# Patient Record
Sex: MALE | Race: WHITE | NOT HISPANIC OR LATINO | Employment: OTHER | URBAN - METROPOLITAN AREA
[De-identification: names, ages, dates, MRNs, and addresses within clinical notes are randomized per-mention and may not be internally consistent; named-entity substitution may affect disease eponyms.]

---

## 2017-04-10 PROBLEM — R45.851 SUICIDAL IDEATION: Status: ACTIVE | Noted: 2017-04-10

## 2017-04-11 ENCOUNTER — HOSPITAL ENCOUNTER (INPATIENT)
Facility: HOSPITAL | Age: 52
LOS: 9 days | Discharge: HOME | DRG: 885 | End: 2017-04-20
Attending: PSYCHIATRY & NEUROLOGY | Admitting: PSYCHIATRY & NEUROLOGY
Payer: MEDICARE

## 2017-04-11 DIAGNOSIS — K21.9 GASTROESOPHAGEAL REFLUX DISEASE WITHOUT ESOPHAGITIS: Chronic | ICD-10-CM

## 2017-04-11 DIAGNOSIS — F12.10 CANNABIS ABUSE: ICD-10-CM

## 2017-04-11 DIAGNOSIS — R45.851 SUICIDAL IDEATION: ICD-10-CM

## 2017-04-11 DIAGNOSIS — I10 ESSENTIAL HYPERTENSION: Chronic | ICD-10-CM

## 2017-04-11 DIAGNOSIS — Z59.00 HOMELESSNESS: ICD-10-CM

## 2017-04-11 DIAGNOSIS — F10.10 ALCOHOL ABUSE: ICD-10-CM

## 2017-04-11 DIAGNOSIS — F31.64 BIPOLAR DISORDER, CURR EPISODE MIXED, SEVERE, WITH PSYCHOTIC FEATURES: Primary | ICD-10-CM

## 2017-04-11 DIAGNOSIS — F29 PSYCHOSIS, UNSPECIFIED PSYCHOSIS TYPE: Chronic | ICD-10-CM

## 2017-04-11 DIAGNOSIS — F14.10 COCAINE ABUSE: ICD-10-CM

## 2017-04-11 LAB
ALBUMIN SERPL BCP-MCNC: 3.6 G/DL
ALP SERPL-CCNC: 61 U/L
ALT SERPL W/O P-5'-P-CCNC: 28 U/L
ANION GAP SERPL CALC-SCNC: 10 MMOL/L
AST SERPL-CCNC: 39 U/L
BILIRUB SERPL-MCNC: 1.4 MG/DL
BUN SERPL-MCNC: 12 MG/DL
CALCIUM SERPL-MCNC: 9.8 MG/DL
CHLORIDE SERPL-SCNC: 100 MMOL/L
CHOLEST/HDLC SERPL: 3.3 {RATIO}
CO2 SERPL-SCNC: 29 MMOL/L
CREAT SERPL-MCNC: 0.8 MG/DL
EST. GFR  (AFRICAN AMERICAN): >60 ML/MIN/1.73 M^2
EST. GFR  (NON AFRICAN AMERICAN): >60 ML/MIN/1.73 M^2
GLUCOSE SERPL-MCNC: 100 MG/DL
HDL/CHOLESTEROL RATIO: 30.7 %
HDLC SERPL-MCNC: 228 MG/DL
HDLC SERPL-MCNC: 70 MG/DL
LDLC SERPL CALC-MCNC: 134.8 MG/DL
NONHDLC SERPL-MCNC: 158 MG/DL
POTASSIUM SERPL-SCNC: 3.2 MMOL/L
PROT SERPL-MCNC: 7.3 G/DL
SODIUM SERPL-SCNC: 139 MMOL/L
TRIGL SERPL-MCNC: 116 MG/DL

## 2017-04-11 PROCEDURE — 90853 GROUP PSYCHOTHERAPY: CPT | Mod: S$PBB,,, | Performed by: PSYCHOLOGIST

## 2017-04-11 PROCEDURE — 12400001 HC PSYCH SEMI-PRIVATE ROOM

## 2017-04-11 PROCEDURE — 80061 LIPID PANEL: CPT

## 2017-04-11 PROCEDURE — 36415 COLL VENOUS BLD VENIPUNCTURE: CPT

## 2017-04-11 PROCEDURE — 97150 GROUP THERAPEUTIC PROCEDURES: CPT

## 2017-04-11 PROCEDURE — 25000003 PHARM REV CODE 250: Performed by: PSYCHIATRY & NEUROLOGY

## 2017-04-11 PROCEDURE — 99223 1ST HOSP IP/OBS HIGH 75: CPT | Mod: ,,, | Performed by: PSYCHIATRY & NEUROLOGY

## 2017-04-11 PROCEDURE — 80053 COMPREHEN METABOLIC PANEL: CPT

## 2017-04-11 PROCEDURE — 97166 OT EVAL MOD COMPLEX 45 MIN: CPT

## 2017-04-11 RX ORDER — IBUPROFEN 200 MG
1 TABLET ORAL DAILY
Status: ON HOLD | COMMUNITY
End: 2017-04-20 | Stop reason: HOSPADM

## 2017-04-11 RX ORDER — VENLAFAXINE HYDROCHLORIDE 150 MG/1
150 CAPSULE, EXTENDED RELEASE ORAL DAILY
Status: ON HOLD | COMMUNITY
End: 2017-04-20 | Stop reason: HOSPADM

## 2017-04-11 RX ORDER — DIAZEPAM 5 MG/1
5 TABLET ORAL 3 TIMES DAILY
Status: DISCONTINUED | OUTPATIENT
Start: 2017-04-11 | End: 2017-04-12

## 2017-04-11 RX ORDER — QUETIAPINE FUMARATE 300 MG/1
300 TABLET, FILM COATED ORAL NIGHTLY
Status: ON HOLD | COMMUNITY
End: 2017-04-20 | Stop reason: HOSPADM

## 2017-04-11 RX ORDER — AMLODIPINE BESYLATE 5 MG/1
5 TABLET ORAL DAILY
Status: ON HOLD | COMMUNITY
End: 2017-04-20 | Stop reason: HOSPADM

## 2017-04-11 RX ORDER — LORAZEPAM 1 MG/1
2 TABLET ORAL EVERY 4 HOURS PRN
Status: DISCONTINUED | OUTPATIENT
Start: 2017-04-11 | End: 2017-04-20 | Stop reason: HOSPADM

## 2017-04-11 RX ORDER — TRAZODONE HYDROCHLORIDE 100 MG/1
100 TABLET ORAL NIGHTLY
Status: ON HOLD | COMMUNITY
End: 2017-04-20 | Stop reason: HOSPADM

## 2017-04-11 RX ORDER — NICOTINE 7MG/24HR
1 PATCH, TRANSDERMAL 24 HOURS TRANSDERMAL DAILY
Status: DISCONTINUED | OUTPATIENT
Start: 2017-04-11 | End: 2017-04-20 | Stop reason: HOSPADM

## 2017-04-11 RX ORDER — LORAZEPAM 1 MG/1
2 TABLET ORAL EVERY 6 HOURS PRN
Status: DISCONTINUED | OUTPATIENT
Start: 2017-04-11 | End: 2017-04-20 | Stop reason: HOSPADM

## 2017-04-11 RX ORDER — PANTOPRAZOLE SODIUM 40 MG/1
40 TABLET, DELAYED RELEASE ORAL DAILY
Status: DISCONTINUED | OUTPATIENT
Start: 2017-04-11 | End: 2017-04-20 | Stop reason: HOSPADM

## 2017-04-11 RX ORDER — DOCUSATE SODIUM 100 MG/1
100 CAPSULE, LIQUID FILLED ORAL DAILY PRN
Status: DISCONTINUED | OUTPATIENT
Start: 2017-04-11 | End: 2017-04-20 | Stop reason: HOSPADM

## 2017-04-11 RX ORDER — OMEPRAZOLE 20 MG/1
20 CAPSULE, DELAYED RELEASE ORAL DAILY
Status: ON HOLD | COMMUNITY
End: 2017-04-20 | Stop reason: HOSPADM

## 2017-04-11 RX ORDER — RISPERIDONE 1 MG/1
1 TABLET, ORALLY DISINTEGRATING ORAL 2 TIMES DAILY
Status: DISCONTINUED | OUTPATIENT
Start: 2017-04-11 | End: 2017-04-13

## 2017-04-11 RX ORDER — AMLODIPINE BESYLATE 2.5 MG/1
5 TABLET ORAL DAILY
Status: DISCONTINUED | OUTPATIENT
Start: 2017-04-11 | End: 2017-04-20 | Stop reason: HOSPADM

## 2017-04-11 RX ORDER — HALOPERIDOL 5 MG/ML
5 INJECTION INTRAMUSCULAR EVERY 6 HOURS PRN
Status: DISCONTINUED | OUTPATIENT
Start: 2017-04-11 | End: 2017-04-20 | Stop reason: HOSPADM

## 2017-04-11 RX ORDER — MELOXICAM 7.5 MG/1
7.5 TABLET ORAL DAILY
Status: ON HOLD | COMMUNITY
End: 2017-04-20 | Stop reason: HOSPADM

## 2017-04-11 RX ORDER — HYDROXYZINE PAMOATE 25 MG/1
25 CAPSULE ORAL 3 TIMES DAILY PRN
Status: ON HOLD | COMMUNITY
End: 2017-04-20 | Stop reason: HOSPADM

## 2017-04-11 RX ORDER — HALOPERIDOL 5 MG/1
5 TABLET ORAL EVERY 6 HOURS PRN
Status: DISCONTINUED | OUTPATIENT
Start: 2017-04-11 | End: 2017-04-20 | Stop reason: HOSPADM

## 2017-04-11 RX ORDER — DIPHENHYDRAMINE HYDROCHLORIDE 50 MG/ML
50 INJECTION INTRAMUSCULAR; INTRAVENOUS EVERY 6 HOURS PRN
Status: DISCONTINUED | OUTPATIENT
Start: 2017-04-11 | End: 2017-04-20 | Stop reason: HOSPADM

## 2017-04-11 RX ORDER — QUETIAPINE FUMARATE 100 MG/1
100 TABLET, FILM COATED ORAL NIGHTLY
Status: DISCONTINUED | OUTPATIENT
Start: 2017-04-11 | End: 2017-04-13

## 2017-04-11 RX ORDER — DIPHENHYDRAMINE HCL 50 MG
50 CAPSULE ORAL EVERY 6 HOURS PRN
Status: DISCONTINUED | OUTPATIENT
Start: 2017-04-11 | End: 2017-04-20 | Stop reason: HOSPADM

## 2017-04-11 RX ORDER — LORAZEPAM 2 MG/ML
2 INJECTION INTRAMUSCULAR EVERY 6 HOURS PRN
Status: DISCONTINUED | OUTPATIENT
Start: 2017-04-11 | End: 2017-04-20 | Stop reason: HOSPADM

## 2017-04-11 RX ORDER — FOLIC ACID 1 MG/1
1 TABLET ORAL DAILY
Status: DISCONTINUED | OUTPATIENT
Start: 2017-04-11 | End: 2017-04-20 | Stop reason: HOSPADM

## 2017-04-11 RX ORDER — RISPERIDONE 1 MG/1
1 TABLET, ORALLY DISINTEGRATING ORAL NIGHTLY
Status: ON HOLD | COMMUNITY
End: 2017-04-20 | Stop reason: HOSPADM

## 2017-04-11 RX ORDER — THIAMINE HCL 100 MG
100 TABLET ORAL DAILY
Status: DISCONTINUED | OUTPATIENT
Start: 2017-04-11 | End: 2017-04-20 | Stop reason: HOSPADM

## 2017-04-11 RX ADMIN — PANTOPRAZOLE SODIUM 40 MG: 40 TABLET, DELAYED RELEASE ORAL at 08:04

## 2017-04-11 RX ADMIN — AMLODIPINE BESYLATE 5 MG: 2.5 TABLET ORAL at 08:04

## 2017-04-11 RX ADMIN — DIAZEPAM 5 MG: 5 TABLET ORAL at 08:04

## 2017-04-11 RX ADMIN — RISPERIDONE 1 MG: 1 TABLET, ORALLY DISINTEGRATING ORAL at 11:04

## 2017-04-11 RX ADMIN — RISPERIDONE 1 MG: 1 TABLET, ORALLY DISINTEGRATING ORAL at 08:04

## 2017-04-11 RX ADMIN — FOLIC ACID 1 MG: 1 TABLET ORAL at 08:04

## 2017-04-11 RX ADMIN — THIAMINE HCL TAB 100 MG 100 MG: 100 TAB at 08:04

## 2017-04-11 RX ADMIN — NICOTINE 1 PATCH: 7 PATCH, EXTENDED RELEASE TRANSDERMAL at 08:04

## 2017-04-11 RX ADMIN — QUETIAPINE FUMARATE 100 MG: 100 TABLET, FILM COATED ORAL at 08:04

## 2017-04-11 RX ADMIN — DIAZEPAM 5 MG: 5 TABLET ORAL at 01:04

## 2017-04-11 RX ADMIN — THERA TABS 1 TABLET: TAB at 08:04

## 2017-04-11 RX ADMIN — QUETIAPINE FUMARATE 100 MG: 100 TABLET, FILM COATED ORAL at 01:04

## 2017-04-11 SDOH — SOCIAL DETERMINANTS OF HEALTH (SDOH): HOMELESSNESS UNSPECIFIED: Z59.00
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
## 2017-04-11 NOTE — PROGRESS NOTES
04/11/17 0900 04/11/17 1100 04/11/17 1400   [facility] Group Therapy   Group Name Community Reintegration (guided imagery) Therapeutic Recreation   Specific Interventions Current Events --  (movie social)   Participation Level Active;Appropriate;Attentive --  Active;Appropriate;Attentive   Participation Quality Cooperative;Social Refused Cooperative;Social   Insight/Motivation Good --  Good   Affect/Mood Display Appropriate --  Appropriate   Cognition Alert;Oriented --  Alert;Oriented

## 2017-04-11 NOTE — PLAN OF CARE
Problem: Patient Care Overview (Adult)  Goal: Plan of Care Review  Outcome: Ongoing (interventions implemented as appropriate)  Understands need to be in hospital and take medication. Cooperative with medication administration.  Goal: Interdisciplinary Rounds/Family Conf  Outcome: Ongoing (interventions implemented as appropriate)  Patient participated with interdisciplinary rounds.    Comments:   Understands need to be in hospital and take medication. Cooperative with medication administration.  Patient participated with interdisciplinary rounds.

## 2017-04-11 NOTE — IP AVS SNAPSHOT
[facility]  [address]  [facility] [address]  Phone: [phone]           Patient Discharge Instructions   Our goal is to set you up for success. This packet includes information on your condition, medications, and your home care.  It will help you care for yourself to prevent having to return to the hospital.     Please ask your nurse if you have any questions.      There are many details to remember when preparing to leave the hospital. Here is what you will need to do:    1. Take your medicine. If you are prescribed medications, review your Medication List on the following pages. You may have new medications to  at the pharmacy and others that you'll need to stop taking. Review the instructions for how and when to take your medications. Talk with your doctor or nurses if you are unsure of what to do.     2. Go to your follow-up appointments. Specific follow-up information is listed in the following pages. Your may be contacted by a nurse or clinical provider about future appointments. Be sure we have all of the phone numbers to reach you. Please contact your provider's office if you are unable to make an appointment.     3. Watch for warning signs. Your doctor or nurse will give you detailed warning signs to watch for and when to call for assistance. These instructions may also include educational information about your condition. If you experience any of warning signs to your health, call your doctor.           [facility]  Unless otherwise directed by your provider, please   contact [facility], our nurse care line   that is available for 24/7 assistance.     [phone] (toll-free)     Registered nurses in the [facility]   provide: appointment scheduling, clinical advisement, health education, and other advisory services.                  ** Verify the list of medication(s) below is accurate and up to date. Carry this with you in case of  emergency. If your medications have changed, please notify your healthcare provider.             Medication List      START taking these medications        Additional Info                      risperidone 2 MG tablet   Commonly known as:  RISPERDAL   Quantity:  30 tablet   Refills:  0   Dose:  2 mg   Indications:  Bipolar Disorder in Remission   Replaces:  risperidone 1 MG Tbdl    Last time this was given:  1 mg on 4/19/2017 10:48 PM   Instructions:  Take 1 tablet (2 mg total) by mouth every evening.     Begin Date    AM    Noon    PM    Bedtime         CHANGE how you take these medications        Additional Info                      hydrOXYzine pamoate 50 MG Cap   Commonly known as:  VISTARIL   Quantity:  30 capsule   Refills:  0   Dose:  50 mg   Indications:  Anxiety   What changed:    - medication strength  - how much to take  - when to take this  - reasons to take this    Last time this was given:  50 mg on 4/19/2017 10:47 PM   Instructions:  Take 1 capsule (50 mg total) by mouth every evening.     Begin Date    AM    Noon    PM    Bedtime       * quetiapine 100 MG Tab   Commonly known as:  SEROQUEL   Quantity:  30 tablet   Refills:  0   Dose:  100 mg   Indications:  Bipolar Disorder in Remission   What changed:    - medication strength  - how much to take  - when to take this    Last time this was given:  100 mg on 4/20/2017  9:37 AM   Instructions:  Take 1 tablet (100 mg total) by mouth once daily.     Begin Date    AM    Noon    PM    Bedtime       * quetiapine 300 MG Tab   Commonly known as:  SEROQUEL   Quantity:  30 tablet   Refills:  0   Dose:  300 mg   Indications:  Bipolar Disorder in Remission   What changed:  You were already taking a medication with the same name, and this prescription was added. Make sure you understand how and when to take each.    Last time this was given:  100 mg on 4/20/2017  9:37 AM   Instructions:  Take 1 tablet (300 mg total) by mouth every evening.     Begin Date    AM     Noon    PM    Bedtime       * Notice:  This list has 2 medication(s) that are the same as other medications prescribed for you. Read the directions carefully, and ask your doctor or other care provider to review them with you.      CONTINUE taking these medications        Additional Info                      amlodipine 5 MG tablet   Commonly known as:  NORVASC   Quantity:  30 tablet   Refills:  0   Dose:  5 mg   Indications:  Hypertension    Last time this was given:  5 mg on 4/20/2017  9:37 AM   Instructions:  Take 1 tablet (5 mg total) by mouth once daily.     Begin Date    AM    Noon    PM    Bedtime         STOP taking these medications     meloxicam 7.5 MG tablet   Commonly known as:  MOBIC       nicotine 21 mg/24 hr   Commonly known as:  NICODERM CQ       omeprazole 20 MG capsule   Commonly known as:  PRILOSEC       risperidone 1 MG Tbdl   Commonly known as:  RISPERDAL M-TABS   Replaced by:  risperidone 2 MG tablet       trazodone 100 MG tablet   Commonly known as:  DESYREL       venlafaxine 150 MG Cp24   Commonly known as:  EFFEXOR-XR            Where to Get Your Medications      You can get these medications from any pharmacy     Bring a paper prescription for each of these medications     amlodipine 5 MG tablet    hydrOXYzine pamoate 50 MG Cap    quetiapine 100 MG Tab    quetiapine 300 MG Tab    risperidone 2 MG tablet                  Please bring to all follow up appointments:    1. A copy of your discharge instructions.  2. All medicines you are currently taking in their original bottles.  3. Identification and insurance card.    Please arrive 15 minutes ahead of scheduled appointment time.    Please call 24 hours in advance if you must reschedule your appointment and/or time.          Discharge Instructions     Future Orders    Activity as tolerated     Diet general     Questions:    Total calories:      Fat restriction, if any:      Protein restriction, if any:      Na restriction, if any:      Fluid  restriction:      Additional restrictions:          Primary Diagnosis     Your primary diagnosis was:  Mood Problem      Admission Information     Date & Time Provider Department CSN    4/11/2017 12:13 AM Salvador Quigley MD [facility] [id number]       Admisson Diagnosis: Suicidal ideation      Care Providers     Provider Role Specialty Primary office phone    Salvador Quigley MD Attending Provider Psychiatry [phone]      Your Vitals Were     BP Pulse Temp Resp Height Weight    133/96 98 98 °F (36.7 °C) 17 6' (1.829 m) 77.3 kg (170 lb 6.7 oz)    BMI                23.11 kg/m2          Recent Lab Values     No lab values to display.      Blood Glucose and Lipid Panel Labs        4/11/2017                          10:09 AM           Cholesterol 228 (H)           Triglycerides 116           HDL Cholesterol 70           LDL Cholesterol 134.8           HDL/Cholesterol Ratio 30.7           Total Cholesterol/HDL Ratio 3.3           Non-HDL Cholesterol 158           Comment for Cholesterol at 10:09 AM on 4/11/2017:  The National Cholesterol Education Program (NCEP) has set the  following guidelines (reference ranges) for Cholesterol:  Optimal.....................<200 mg/dL  Borderline High.............200-239 mg/dL  High........................> or = 240 mg/dL      Comment for Triglycerides at 10:09 AM on 4/11/2017:  The National Cholesterol Education Program (NCEP) has set the  following guidelines (reference values) for triglycerides:  Normal......................<150 mg/dL  Borderline High.............150-199 mg/dL  High........................200-499 mg/dL      Comment for HDL Cholesterol at 10:09 AM on 4/11/2017:  The National Cholesterol Education Program (NCEP) has set the  following guidelines (reference values) for HDL Cholesterol:  Low...............<40 mg/dL  Optimal...........>60 mg/dL      Comment for LDL Cholesterol at 10:09 AM on 4/11/2017:  The National Cholesterol Education  Program (NCEP) has set the  following guidelines (reference values) for LDL Cholesterol:  Optimal.......................<130 mg/dL  Borderline High...............130-159 mg/dL  High..........................160-189 mg/dL  Very High.....................>190 mg/dL      Comment for Non-HDL Cholesterol at 10:09 AM on 4/11/2017:  Risk category and Non-HDL cholesterol goals:  Coronary heart disease (CHD)or equivalent (10-year risk of CHD >20%):  Non-HDL cholesterol goal     <130 mg/dL  Two or more CHD risk factors and 10-year risk of CHD <= 20%:  Non-HDL cholesterol goal     <160 mg/dL  0 to 1 CHD risk factor:  Non-HDL cholesterol goal     <190 mg/dL        Allergies as of 4/20/2017     No Known Allergies      Advance Directives     An advance directive is a document which, in the event you are no longer able to make decisions for yourself, tells your healthcare team what kind of treatment you do or do not want to receive, or who you would like to make those decisions for you.  If you do not currently have an advance directive, [facility] encourages you to create one.  For more information call:  [phone] ([phone]), [phone] ([phone]),  or log on to [email or web address].        Advance Directive Status          Most Recent Value    Advance Directive Status  Patient does not have Advance Directive, declines information.      Smoking Cessation     If you would like to quit smoking:   You may be eligible for free services if you are a [place] resident and started smoking cigarettes before September 1, 1988.  Call the Smoking Cessation Trust (SCT) toll free at (275) 875-2498 or (780) 599-9341.   Call 0-800-QUIT-NOW if you do not meet the above criteria.   Contact us via email: [email or web address]   View our website for more information: [email or web address]        Language Assistance Services     ATTENTION: Language assistance services are available, free of charge. Please call  [phone].      ATENCIÓN: Si habla domitila, tiene a rae disposición servicios gratuitos de asistencia lingüística. Cal al [phone].     CHÚ Ý: N?u b?n nói Ti?ng Vi?t, có các d?ch v? h? tr? ngôn ng? mi?n phí dành cho b?n. G?i s? [phone].        National Suicide Prevention Lifeline     If you or someone you know is thinking about suicide, call the National Suicide Prevention Lifeline.  National Suicide Hotline: 3-232-965-UREX (5230)  The lifeline is free, confidential and always available.  Help a loved one, a friend or yourself.  www.suicideprevenetionlifeline.org          [facility] Sign-Up     Activating your [facility] account is as easy as 1-2-3!     1) Visit [email or web address], select Sign Up Now, enter this activation code and your date of birth, then select Next.  [email or web address]  Expires: 6/4/2017  9:59 AM      2) Create a username and password to use when you visit [facility] in the future and select a security question in case you lose your password and select Next.    3) Enter your e-mail address and click Sign Up!    Additional Information  If you have questions, please e-mail [email or web address] or call [phone] to talk to our [facility] staff. Remember, [facility] is NOT to be used for urgent needs. For medical emergencies, dial 911.          [facility] complies with applicable Federal civil rights laws and does not discriminate on the basis of race, color, national origin, age, disability, or sex.
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
## 2017-04-11 NOTE — PROGRESS NOTES
Out of room ad narciso.  Attended unit functions and interacting with others.  Denies hearing voices or visual hallucinations.  Has reality based conversations.  Denies suicide ideation at this time.  Verbalized no thoughts to harm self.  Cooperative with staff.
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
## 2017-04-12 PROBLEM — Z59.00 HOMELESSNESS: Status: ACTIVE | Noted: 2017-04-12

## 2017-04-12 PROBLEM — I10 ESSENTIAL HYPERTENSION: Chronic | Status: ACTIVE | Noted: 2017-04-12

## 2017-04-12 PROBLEM — K21.9 GASTROESOPHAGEAL REFLUX DISEASE WITHOUT ESOPHAGITIS: Chronic | Status: ACTIVE | Noted: 2017-04-12

## 2017-04-12 LAB — HIV1+2 IGG SERPL QL IA.RAPID: NEGATIVE

## 2017-04-12 PROCEDURE — 25000003 PHARM REV CODE 250: Performed by: PSYCHIATRY & NEUROLOGY

## 2017-04-12 PROCEDURE — 97150 GROUP THERAPEUTIC PROCEDURES: CPT

## 2017-04-12 PROCEDURE — 12400001 HC PSYCH SEMI-PRIVATE ROOM

## 2017-04-12 PROCEDURE — 86701 HIV-1ANTIBODY: CPT

## 2017-04-12 PROCEDURE — 36415 COLL VENOUS BLD VENIPUNCTURE: CPT

## 2017-04-12 PROCEDURE — 90853 GROUP PSYCHOTHERAPY: CPT | Mod: S$PBB,,, | Performed by: PSYCHOLOGIST

## 2017-04-12 PROCEDURE — 99233 SBSQ HOSP IP/OBS HIGH 50: CPT | Mod: ,,, | Performed by: PSYCHIATRY & NEUROLOGY

## 2017-04-12 RX ORDER — DIAZEPAM 5 MG/1
5 TABLET ORAL 2 TIMES DAILY
Status: DISCONTINUED | OUTPATIENT
Start: 2017-04-12 | End: 2017-04-15

## 2017-04-12 RX ADMIN — DIAZEPAM 5 MG: 5 TABLET ORAL at 08:04

## 2017-04-12 RX ADMIN — LORAZEPAM 2 MG: 1 TABLET ORAL at 11:04

## 2017-04-12 RX ADMIN — PANTOPRAZOLE SODIUM 40 MG: 40 TABLET, DELAYED RELEASE ORAL at 08:04

## 2017-04-12 RX ADMIN — THIAMINE HCL TAB 100 MG 100 MG: 100 TAB at 08:04

## 2017-04-12 RX ADMIN — RISPERIDONE 1 MG: 1 TABLET, ORALLY DISINTEGRATING ORAL at 08:04

## 2017-04-12 RX ADMIN — NICOTINE 1 PATCH: 7 PATCH, EXTENDED RELEASE TRANSDERMAL at 11:04

## 2017-04-12 RX ADMIN — THERA TABS 1 TABLET: TAB at 08:04

## 2017-04-12 RX ADMIN — QUETIAPINE FUMARATE 100 MG: 100 TABLET, FILM COATED ORAL at 08:04

## 2017-04-12 RX ADMIN — FOLIC ACID 1 MG: 1 TABLET ORAL at 08:04

## 2017-04-12 RX ADMIN — AMLODIPINE BESYLATE 5 MG: 2.5 TABLET ORAL at 08:04

## 2017-04-12 RX ADMIN — DIAZEPAM 5 MG: 5 TABLET ORAL at 05:04
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
## 2017-04-12 NOTE — PROGRESS NOTES
Pt calm and cooperative on the unit attends group and participates. Pt slept ~ 8 hours last night. Still on Valium taper no prn medicine required for alcohol withdrawals during the night.

## 2017-04-12 NOTE — PROGRESS NOTES
Group Psychotherapy (PhD/LCSW)    Site: [facility]    Clinical status of patient: Inpatient    Date: 4/12/2017    Group Focus: Life Skills    Length of service: 83239 - 35-40 minutes    Number of patients in attendance: 5    Referred by: Acute Psychiatry Unit Treatment Team    Target symptoms: Alcohol Abuse, Substance Abuse and Psychosis    Patient's response to treatment: Active Listening Self-disclosure    Progress toward goals: Progressing slowly    Interval History:  Pt participated actively in a group discussion of problems solving techniques to resolve interpersonal conflict. His participation was more appropriate today. He clearly listened in group and was able to apply some of the content to his own situation. At times he needed to be limited so that he would not dominate the group with his concerns.       Diagnosis: See Dr Quigley's notes on chart dated 4/12/17    Plan: Continue treatment on [facility]

## 2017-04-12 NOTE — PLAN OF CARE
Problem: Patient Care Overview (Adult)  Goal: Plan of Care Review  Outcome: Ongoing (interventions implemented as appropriate)  Patient is visible on the unit.  Thought process appear organized and goal directed.  Mood is improving less negative and depressed.  Medication complaint.  Remains on the valium taper.  Attends groups and activities.  Social with select peer.      Problem: Mood Impairment (Depressive Signs/Symptoms) (Adult)  Goal: Improved Mood Symptoms  Outcome: Ongoing (interventions implemented as appropriate)  Mood less depressed     Problem: Cognitive Impairment (Depressive Signs/Symptoms) (Adult)  Goal: Improved Ability to Think/Concentrate  Outcome: Ongoing (interventions implemented as appropriate)  Thought processes are improved.  More organized and goal directed.     Problem: Impaired Control (Excessive Substance Use) (Adult)  Goal: Participates in Recovery Program  Outcome: Ongoing (interventions implemented as appropriate)  Patient cooperative with valium taper.     Problem: Safety Awareness Impairment (Excessive Substance Use) (Adult)  Goal: Enhanced Safety Awareness  Outcome: Ongoing (interventions implemented as appropriate)  Patient verbalizes understanding of the negative effects of substance abuse.

## 2017-04-12 NOTE — ASSESSMENT & PLAN NOTE
He self reported heavy drinking prior to admit, but ETOH level non detectable in ER  He had some mild vital signs abnormalities on admit - short valium taper initiated  No s/sx complicated withdrawal at this time - cont to monitor  Step down on valium to 5mg bid today

## 2017-04-12 NOTE — NURSING
Patient is visible on the unit.  Mood is anxious.  Reports he feels like he will not sleep on current HS medication.  Thought process are organize and goal directed.  Denies audio/visual hallucinations.  Also denies suicidal or homicidal ideations.   Remains on valium taper with no complaints of detox symptoms.  Dressed in paper scrubs appearance is unkempt.

## 2017-04-12 NOTE — ASSESSMENT & PLAN NOTE
Currently on norvasc  This could complicate management of alcohol withdrawal - will monitor closely
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
## 2017-04-12 NOTE — ASSESSMENT & PLAN NOTE
Could be contributing to reported psychotic symptoms  He is possibly interested in rehab  Motivational interviewing and relapse prevention provided

## 2017-04-12 NOTE — ASSESSMENT & PLAN NOTE
Patient endorses AH and PI but symptoms vague and history unclear.  Continue trial of risperdal 1mg bid  Also will allow low dose of seroquel to primarily target sleep

## 2017-04-12 NOTE — PROGRESS NOTES
04/12/17 0900 04/12/17 1100 04/12/17 1400   [facility] Group Therapy   Group Name Community Reintegration Education Therapeutic Recreation   Specific Interventions Current Events Relapse Prevention Crafts   Participation Level Active;Appropriate;Attentive Active;Appropriate;Attentive Active;Appropriate;Attentive   Participation Quality Cooperative;Social Cooperative;Social Cooperative;Social   Insight/Motivation Good Good Good   Affect/Mood Display Appropriate Appropriate Appropriate   Cognition Alert;Oriented Alert;Oriented Alert;Oriented

## 2017-04-12 NOTE — ASSESSMENT & PLAN NOTE
assisting  Looking into residential rehab vs group home vs homeless shelter as possible dispo plans  Will also call sister to investigate possible family assistance

## 2017-04-13 PROCEDURE — 12400001 HC PSYCH SEMI-PRIVATE ROOM

## 2017-04-13 PROCEDURE — 90853 GROUP PSYCHOTHERAPY: CPT | Mod: S$PBB,,, | Performed by: PSYCHOLOGIST

## 2017-04-13 PROCEDURE — 99232 SBSQ HOSP IP/OBS MODERATE 35: CPT | Mod: ,,, | Performed by: PSYCHIATRY & NEUROLOGY

## 2017-04-13 PROCEDURE — 25000003 PHARM REV CODE 250: Performed by: PSYCHIATRY & NEUROLOGY

## 2017-04-13 RX ORDER — QUETIAPINE FUMARATE 200 MG/1
200 TABLET, FILM COATED ORAL NIGHTLY
Status: DISCONTINUED | OUTPATIENT
Start: 2017-04-13 | End: 2017-04-14

## 2017-04-13 RX ADMIN — PANTOPRAZOLE SODIUM 40 MG: 40 TABLET, DELAYED RELEASE ORAL at 09:04

## 2017-04-13 RX ADMIN — DIAZEPAM 5 MG: 5 TABLET ORAL at 09:04

## 2017-04-13 RX ADMIN — RISPERIDONE 1 MG: 1 TABLET, ORALLY DISINTEGRATING ORAL at 09:04

## 2017-04-13 RX ADMIN — NICOTINE 1 PATCH: 7 PATCH, EXTENDED RELEASE TRANSDERMAL at 09:04

## 2017-04-13 RX ADMIN — THIAMINE HCL TAB 100 MG 100 MG: 100 TAB at 09:04

## 2017-04-13 RX ADMIN — QUETIAPINE FUMARATE 200 MG: 200 TABLET, FILM COATED ORAL at 08:04

## 2017-04-13 RX ADMIN — FOLIC ACID 1 MG: 1 TABLET ORAL at 09:04

## 2017-04-13 RX ADMIN — THERA TABS 1 TABLET: TAB at 09:04

## 2017-04-13 RX ADMIN — DIAZEPAM 5 MG: 5 TABLET ORAL at 08:04

## 2017-04-13 RX ADMIN — AMLODIPINE BESYLATE 5 MG: 2.5 TABLET ORAL at 09:04

## 2017-04-13 NOTE — PROGRESS NOTES
sandy spoke to [facility]LA[facility] and sw will be sendind a referral for possibilities of placement with group home housing in the [facility].

## 2017-04-13 NOTE — PROGRESS NOTES
04/13/17 0900 04/13/17 1000 04/13/17 1100   [facility] Group Therapy   Group Name Community Reintegration Mental Awareness (guided imagery)   Specific Interventions Current Events Cognitive Stimulation Training Relaxation Training   Participation Level Active;Attentive;Sharing;Appropriate Active;Attentive Active;Supportive;Appropriate   Participation Quality Cooperative;Social Cooperative;Social Cooperative;Social   Insight/Motivation Good Good Good   Affect/Mood Display Appropriate Appropriate Appropriate   Cognition Alert;Oriented Alert;Oriented Alert;Oriented       04/13/17 1300   [facility] Group Therapy   Group Name Therapeutic Recreation   Specific Interventions MWI   Participation Level Active;Appropriate;Attentive   Participation Quality Cooperative;Social   Insight/Motivation Good   Affect/Mood Display Appropriate   Cognition Alert;Oriented
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
## 2017-04-13 NOTE — ASSESSMENT & PLAN NOTE
Could be contributing to reported psychotic symptoms  He is possibly interested in rehab  Motivational interviewing and relapse prevention provided; will continue

## 2017-04-13 NOTE — ASSESSMENT & PLAN NOTE
-Patient denies psychotic symptoms at this time, but reported AH last night  -Discontinue risperdal; will use seroquel to target psychotic symptoms and mood stabilization  -Increase seroquel to 200 mg qHS (pt says this is the medication that works best for him)

## 2017-04-13 NOTE — PROGRESS NOTES
04/13/17 0700   Patient/Family Goals   Goal Formulation With patient   Time For Goal Achievement 5 days   Goal 1 attend activities, relaxation, and relapse prevention education   Assessment   Leisure Interest Movies;Sit Outside;Enjoy Nature   Leisure Barriers Homeless;Poor Impulse Control;ETOH Use;Drug Use;Physical Limitation;Lack of Finances   Treatment Focus To Improve Mood;To Educate and Increase Awareness of Sober Free Activities;To Improve Coping Skills;To Decrease Agitation and Increase Frustration Tolerance

## 2017-04-13 NOTE — ASSESSMENT & PLAN NOTE
He self reported heavy drinking prior to admit, but ETOH level non detectable in ER  He had some mild vital signs abnormalities on admit - short valium taper initiated  No s/sx complicated withdrawal at this time - cont to monitor  Continue valium 5 mg BID

## 2017-04-13 NOTE — PROGRESS NOTES
"Pt slept ~ 7 hours last night. He did receive a prn Ativan for anxiety @ 22:50, he disagrees with the decrease in the Seroquel, states " I need 400 mg to sleep." Med compliant attends group and participates. Good appetite and good hygiene. Pt gets irritated when he does not get what he wants, but has not acted out. POC discussed no questions at this time.   "

## 2017-04-13 NOTE — PROGRESS NOTES
Pt does not have any income at this time and cannot afford a group home. Pt receives SSI and disability income. Sw discussed option available at this time. Pt provided a phone number for his sister Karen Cruz [phone]. Sw called her but she is at work and could not talk.    Sw will continue to look at options available. Patient is homeless.

## 2017-04-13 NOTE — PROGRESS NOTES
Group Psychotherapy (PhD/LCSW)    Site: [facility]    Clinical status of patient: Inpatient    Date: 4/13/2017    Group Focus: Life Skills    Length of service: 47570 - 35-40 minutes    Number of patients in attendance: 5    Referred by: Acute Psychiatry Unit Treatment Team    Target symptoms: Alcohol Abuse, Substance Abuse and Psychosis    Patient's response to treatment: Active Listening Self-disclosure    Progress toward goals: Progressing slowly    Interval History:  Pt participated briefly in group and then appeared to become drowsy and lose interest. He noted that he is attempting to control himself and manage his anger. He said he was able to catch himself from getting too upset about something that happened on the unit that bothered him.      Diagnosis: See Dr Pagan's notes on chart dated 4/13/17    Plan: Continue treatment on [facility]

## 2017-04-14 PROCEDURE — 25000003 PHARM REV CODE 250: Performed by: PSYCHIATRY & NEUROLOGY

## 2017-04-14 PROCEDURE — 99233 SBSQ HOSP IP/OBS HIGH 50: CPT | Mod: S$PBB,GC,, | Performed by: INTERNAL MEDICINE

## 2017-04-14 PROCEDURE — 12400001 HC PSYCH SEMI-PRIVATE ROOM

## 2017-04-14 RX ORDER — QUETIAPINE FUMARATE 200 MG/1
400 TABLET, FILM COATED ORAL NIGHTLY
Status: DISCONTINUED | OUTPATIENT
Start: 2017-04-14 | End: 2017-04-15

## 2017-04-14 RX ADMIN — NICOTINE 1 PATCH: 7 PATCH, EXTENDED RELEASE TRANSDERMAL at 09:04

## 2017-04-14 RX ADMIN — DIAZEPAM 5 MG: 5 TABLET ORAL at 09:04

## 2017-04-14 RX ADMIN — FOLIC ACID 1 MG: 1 TABLET ORAL at 09:04

## 2017-04-14 RX ADMIN — THERA TABS 1 TABLET: TAB at 09:04

## 2017-04-14 RX ADMIN — THIAMINE HCL TAB 100 MG 100 MG: 100 TAB at 09:04

## 2017-04-14 RX ADMIN — AMLODIPINE BESYLATE 5 MG: 2.5 TABLET ORAL at 09:04

## 2017-04-14 RX ADMIN — PANTOPRAZOLE SODIUM 40 MG: 40 TABLET, DELAYED RELEASE ORAL at 09:04

## 2017-04-14 NOTE — PROGRESS NOTES
04/14/17 1400   [facility] Group Therapy   Group Name Therapeutic Recreation   Specific Interventions Holiday Activity   Participation Level Active;Appropriate;Attentive   Participation Quality Cooperative   Insight/Motivation Good   Affect/Mood Display Appropriate   Cognition Alert;Oriented

## 2017-04-14 NOTE — PLAN OF CARE
Problem: Patient Care Overview (Adult)  Goal: Plan of Care Review  Outcome: Ongoing (interventions implemented as appropriate)  Patient is dressed in street clothes.  Mood is improved.  Patient reports less depressed.  Thought processes are clearer and goal directed.  Compliant with medication.  Reports not sleeping well.  Appetite good.  Visible on the unit.  Attending select group.  Minimally social with peers.     Problem: Mood Impairment (Depressive Signs/Symptoms) (Adult)  Goal: Improved Mood Symptoms  Outcome: Ongoing (interventions implemented as appropriate)  Mood is improved with some irrtabililty noted.    Problem: Cognitive Impairment (Depressive Signs/Symptoms) (Adult)  Goal: Improved Ability to Think/Concentrate  Outcome: Outcome(s) achieved Date Met:  04/14/17  Thought process  Is clear and goal directed.     Problem: Impaired Control (Excessive Substance Use) (Adult)  Goal: Participates in Recovery Program  Outcome: Ongoing (interventions implemented as appropriate)  Minimally cooperative with recovery plan    Problem: Safety Awareness Impairment (Excessive Substance Use) (Adult)  Goal: Enhanced Safety Awareness  Outcome: Ongoing (interventions implemented as appropriate)  Verbalizes understanding of the negative effects of substance abuse.
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
## 2017-04-14 NOTE — ASSESSMENT & PLAN NOTE
-Patient is intrusive, has perseverative tp, and is endorsing near constant AHx  -Discontinued risperdal; will use seroquel to target psychotic symptoms and mood stabilization  -Increase seroquel to 400 mg qHS (pt says this is the medication that works best for him)

## 2017-04-14 NOTE — PROGRESS NOTES
04/13/17 2300   Activity/Group Therapy Checklist   Group Goals/Reflection   Attendance Attended   Follows Direction Followed directions   Group Interactions/Observations Interacted appropriately   Affect/Mood Range Normal range   Affect/Mood Display Appropriate
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
## 2017-04-15 PROCEDURE — 25000003 PHARM REV CODE 250: Performed by: PSYCHIATRY & NEUROLOGY

## 2017-04-15 PROCEDURE — 99232 SBSQ HOSP IP/OBS MODERATE 35: CPT | Mod: S$PBB,GC,, | Performed by: INTERNAL MEDICINE

## 2017-04-15 PROCEDURE — 12400001 HC PSYCH SEMI-PRIVATE ROOM

## 2017-04-15 RX ORDER — DIAZEPAM 5 MG/1
5 TABLET ORAL DAILY
Status: DISCONTINUED | OUTPATIENT
Start: 2017-04-16 | End: 2017-04-18

## 2017-04-15 RX ORDER — RISPERIDONE 1 MG/1
2 TABLET ORAL NIGHTLY
Status: DISCONTINUED | OUTPATIENT
Start: 2017-04-15 | End: 2017-04-18

## 2017-04-15 RX ORDER — RISPERIDONE 1 MG/1
1 TABLET ORAL 2 TIMES DAILY PRN
Status: DISCONTINUED | OUTPATIENT
Start: 2017-04-15 | End: 2017-04-20 | Stop reason: HOSPADM

## 2017-04-15 RX ADMIN — DIAZEPAM 5 MG: 5 TABLET ORAL at 09:04

## 2017-04-15 RX ADMIN — RISPERIDONE 2 MG: 1 TABLET ORAL at 09:04

## 2017-04-15 RX ADMIN — AMLODIPINE BESYLATE 5 MG: 2.5 TABLET ORAL at 09:04

## 2017-04-15 RX ADMIN — DIPHENHYDRAMINE HYDROCHLORIDE 50 MG: 50 CAPSULE ORAL at 11:04

## 2017-04-15 RX ADMIN — HALOPERIDOL 5 MG: 5 TABLET ORAL at 11:04

## 2017-04-15 RX ADMIN — FOLIC ACID 1 MG: 1 TABLET ORAL at 09:04

## 2017-04-15 RX ADMIN — PANTOPRAZOLE SODIUM 40 MG: 40 TABLET, DELAYED RELEASE ORAL at 09:04

## 2017-04-15 RX ADMIN — LORAZEPAM 2 MG: 1 TABLET ORAL at 11:04

## 2017-04-15 RX ADMIN — THIAMINE HCL TAB 100 MG 100 MG: 100 TAB at 09:04

## 2017-04-15 RX ADMIN — NICOTINE 1 PATCH: 7 PATCH, EXTENDED RELEASE TRANSDERMAL at 09:04

## 2017-04-15 RX ADMIN — THERA TABS 1 TABLET: TAB at 09:04

## 2017-04-15 NOTE — NURSING
Patient refused hs medication because they did not give him the medication the way  He wanted them.  Tried to talk to patient to change his mind with no results.

## 2017-04-15 NOTE — NURSING
"Pt observed kicking furniture in tv room. Pt is visibly upset stating that he asked for tv volume to be raised but it did not happen fast enough for him. Pt loud, cursing, states he is upset bc his seroquil dosage was decreased and he did not receive something to help him sleep. Pt was asked if he wanted something to help him relax but he refused and states "I ani't taking nothing. If they don't give me medicine I ask for I ain't taking nothing". Pt heard making verbal threats against other staff. Pt is asked to calm down, will continue to monitor closely.   "

## 2017-04-15 NOTE — ASSESSMENT & PLAN NOTE
He self reported heavy drinking prior to admit, but ETOH level non detectable in ER  He had some mild vital signs abnormalities on admit - short valium taper initiated  No s/sx complicated withdrawal at this time - cont to monitor  Decrease valium 5 mg daily

## 2017-04-15 NOTE — PROGRESS NOTES
Pt had ~ 5 hours of sleep. Pt remain free from fall or injury during HS. Continue to monitor pt safety and POC.
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
## 2017-04-15 NOTE — PROGRESS NOTES
Pt calm and cooperative this evening attends group and participates. Follow direction, Pt did refuse HS medicine because he now wants risperidone along with 400 mg Seroquel. Pt denies SI/HI/VH, he does endorse AH but will not elaborate on this. I have not seen any signs of psychosis but will continue to monitor closely for changes.

## 2017-04-15 NOTE — NURSING
Pt decided to take his medicines today. Pt less irritable and not agitated at this moment. Pt compliant with treatment at this time.
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
## 2017-04-15 NOTE — SUBJECTIVE & OBJECTIVE
Interval History: Pt seen and examined in treatment team.  States he is doing well this AM, but wants to resume Risperdal in addition to his Seroquel.  Clarified with patient that he could take Seroquel or Risperdal, but not both.  He agrees to take Risperdal instead of Seroquel as it was more helpful for his anxiety.  He is very fixated on his medications, and kicked some furniture in frustration this AM and refused medications.  He later calmed down and took his medications later.  Does not appear to be in alcohol withdrawal this am but BP elevated to 141/102 this AM, but he is not tachycardic.  No other complaints.      Denies SI, HI, but reports hearing AVH over night because he did not take his medications overnight.      Psychotherapeutics     Start     Stop Route Frequency Ordered    04/11/17 0207  haloperidol tablet 5 mg  (Med - Acute  Behavioral Management)      -- Oral Every 6 hours PRN 04/11/17 0125    04/11/17 0208  lorazepam tablet 2 mg  (Med - Acute  Behavioral Management)      -- Oral Every 6 hours PRN 04/11/17 0125    04/11/17 0208  haloperidol lactate injection 5 mg  (Med - Acute  Behavioral Management)      -- IM Every 6 hours PRN 04/11/17 0125    04/11/17 0208  lorazepam injection 2 mg  (Med - Acute  Behavioral Management)      -- IM Every 6 hours PRN 04/11/17 0125    04/11/17 0209  lorazepam tablet 2 mg      -- Oral Every 4 hours PRN 04/11/17 0125    04/12/17 2100  diazePAM tablet 5 mg      -- Oral 2 times daily 04/12/17 0840    04/14/17 2100  quetiapine tablet 400 mg      -- Oral Nightly 04/14/17 1218           Review of Systems  Objective:     Vital Signs (Most Recent):  Temp: 97.8 °F (36.6 °C) (04/14/17 1945)  Pulse: 94 (04/14/17 1945)  Resp: 18 (04/14/17 1945)  BP: (!) 143/92 (04/14/17 1945) Vital Signs (24h Range):  Temp:  [97.8 °F (36.6 °C)-98.7 °F (37.1 °C)] 97.8 °F (36.6 °C)  Pulse:  [] 94  Resp:  [17-20] 18  BP: (125-143)/(61-92) 143/92     Height: 6' (182.9 cm)  Weight: 77.3 kg  "(170 lb 6.7 oz)  Body mass index is 23.11 kg/(m^2).    No intake or output data in the 24 hours ending 04/15/17 1106    Physical Exam   Psychiatric:   Mental Status Exam:    General: dressed with a blanket  Appearance: in paper scrubs and blanket  Orientation: person, place, situation  Behavior: mostly cooperative  Psychomotor:  No pmr/pma  Speech: normal tone, volume, prosody  Language: responds appropriately  Mood: "I'm not good today, y'all changed my medications"  Affect: mostly neutral  Thought process: linear  Thought content:denies SI, HI  Thought perceptions: endorsed AVH overnight, none current  Attention: intact to conversation  Associations: non loose  Insight: poor  Judgment: limited but improving          Significant Labs:   Last 24 Hours:   Recent Lab Results     None          Significant Imaging: I have reviewed all pertinent imaging results/findings within the past 24 hours.  "

## 2017-04-15 NOTE — ASSESSMENT & PLAN NOTE
-Patient is intrusive, has perseverative tp, and is endorsing near constant AHx  -Discontinued Seroquel as patient noted more improvement with Risperdal  -Resume Risperdal 2mg PO qhs and add PRN Risperdal 1mg PO BID for breakthrough psychosis or agitation

## 2017-04-15 NOTE — PLAN OF CARE
Problem: Patient Care Overview (Adult)  Goal: Plan of Care Review  Outcome: Ongoing (interventions implemented as appropriate)  Pt was irritable and agitated for part of the day. Pt became verbally abusive and threatening to staff. Pt was redirected for part of the day w/o medicines, but had to receive prn oral b52 due to outburst at [facility] and continued to make lewd threats at staff. Pt was splitting staff, but became compliant after sleeping for 1 hr post b52. Pt was compliant with medicines this morning after initially refusing morning medications bc he wanted risperidone. Pt was visible on the unit, stayed in the tv room for most of the day. Pt denied any pain but reported anxiety earlier in the day. Pt had no outburst after b52 and being redirected, Pt asked to not threaten staff anymore, pt verbalized understanding. NAD noted, will continue to monitor.

## 2017-04-16 PROCEDURE — 25000003 PHARM REV CODE 250: Performed by: PSYCHIATRY & NEUROLOGY

## 2017-04-16 PROCEDURE — 99232 SBSQ HOSP IP/OBS MODERATE 35: CPT | Mod: S$PBB,GC,, | Performed by: INTERNAL MEDICINE

## 2017-04-16 PROCEDURE — 12400001 HC PSYCH SEMI-PRIVATE ROOM

## 2017-04-16 RX ORDER — HYDROXYZINE PAMOATE 50 MG/1
50 CAPSULE ORAL NIGHTLY
Status: DISCONTINUED | OUTPATIENT
Start: 2017-04-16 | End: 2017-04-20 | Stop reason: HOSPADM

## 2017-04-16 RX ADMIN — THERA TABS 1 TABLET: TAB at 09:04

## 2017-04-16 RX ADMIN — HYDROXYZINE PAMOATE 50 MG: 50 CAPSULE ORAL at 08:04

## 2017-04-16 RX ADMIN — THIAMINE HCL TAB 100 MG 100 MG: 100 TAB at 09:04

## 2017-04-16 RX ADMIN — DIAZEPAM 5 MG: 5 TABLET ORAL at 09:04

## 2017-04-16 RX ADMIN — FOLIC ACID 1 MG: 1 TABLET ORAL at 09:04

## 2017-04-16 RX ADMIN — PANTOPRAZOLE SODIUM 40 MG: 40 TABLET, DELAYED RELEASE ORAL at 09:04

## 2017-04-16 RX ADMIN — AMLODIPINE BESYLATE 5 MG: 2.5 TABLET ORAL at 09:04

## 2017-04-16 RX ADMIN — RISPERIDONE 2 MG: 1 TABLET ORAL at 08:04

## 2017-04-16 RX ADMIN — NICOTINE 1 PATCH: 7 PATCH, EXTENDED RELEASE TRANSDERMAL at 09:04

## 2017-04-16 NOTE — ASSESSMENT & PLAN NOTE
-Patient has been intrusive, perseverative tp, and is endorsed near constant AH; no current AVH  -Discontinued Seroquel as patient noted more improvement with Risperdal  -Continue Risperdal 2mg PO qhs and add PRN Risperdal 1mg PO BID for breakthrough psychosis or agitation   -Start 50mg Vistaril qHS to improve sleep

## 2017-04-16 NOTE — PLAN OF CARE
Problem: Patient Care Overview (Adult)  Goal: Plan of Care Review  Outcome: Ongoing (interventions implemented as appropriate)  Pt. awake in bed upon initial assessment. Denied SI/HI, A/V hallucinations. Pt's quiet isolative and withdrawn, did not attend scheduled groups. Took scheduled medications without any problems. No agitation or hostiltiy noted. Will continue to monitor.
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
## 2017-04-16 NOTE — SUBJECTIVE & OBJECTIVE
"Interval History: Per staff: "Pt was irritable and agitated for part of the day. Pt became verbally abusive and threatening to staff. Pt was redirected for part of the day w/o medicines, but had to receive prn oral b52 due to outburst at [facility] and continued to make lewd threats at staff. Pt was splitting staff, but became compliant after sleeping for 1 hr post b52. Pt was compliant with medicines this morning after initially refusing morning medications bc he wanted risperidone. Pt was visible on the unit, stayed in the tv room for most of the day. Pt denied any pain but reported anxiety earlier in the day. Pt had no outburst after b52 and being redirected, Pt asked to not threaten staff anymore, pt verbalized understanding. NAD noted, will continue to monitor."    Patient reports doing "okay" today. Denies any current A/V hallucinations. Continues with poor sleep, and is agreeable to trial of Vistaril. Endorses continued thoughts of harming others once he gets discharged, but "would rather not say" who it is targeted towards. No current SI, but does endorse episodic SI with multiple possible plans. Good appetite.    Psychotherapeutics     Start     Stop Route Frequency Ordered    04/11/17 0207  haloperidol tablet 5 mg  (Med - Acute  Behavioral Management)      -- Oral Every 6 hours PRN 04/11/17 0125    04/11/17 0208  lorazepam tablet 2 mg  (Med - Acute  Behavioral Management)      -- Oral Every 6 hours PRN 04/11/17 0125    04/11/17 0208  haloperidol lactate injection 5 mg  (Med - Acute  Behavioral Management)      -- IM Every 6 hours PRN 04/11/17 0125    04/11/17 0208  lorazepam injection 2 mg  (Med - Acute  Behavioral Management)      -- IM Every 6 hours PRN 04/11/17 0125    04/11/17 0209  lorazepam tablet 2 mg      -- Oral Every 4 hours PRN 04/11/17 0125    04/15/17 2100  risperidone tablet 2 mg      -- Oral Nightly 04/15/17 1120    04/15/17 1220  risperidone tablet 1 mg      -- Oral 2 times daily PRN 04/15/17 1120 " "   04/16/17 0900  diazePAM tablet 5 mg      -- Oral Daily 04/15/17 1129           Review of Systems  Objective:     Vital Signs (Most Recent):  Temp: 99 °F (37.2 °C) (04/16/17 0930)  Pulse: 101 (04/16/17 0930)  Resp: 18 (04/15/17 1938)  BP: (!) 146/89 (04/16/17 0930) Vital Signs (24h Range):  Temp:  [97.6 °F (36.4 °C)-99 °F (37.2 °C)] 99 °F (37.2 °C)  Pulse:  [] 101  Resp:  [16-18] 18  BP: (132-146)/() 146/89     Height: 6' (182.9 cm)  Weight: 77.3 kg (170 lb 6.7 oz)  Body mass index is 23.11 kg/(m^2).    No intake or output data in the 24 hours ending 04/16/17 1005    Physical Exam     MUSCULOSKELETAL  Muscle Strength and Tone: normal strength and tone  Abnormal Involuntary Movements: no abnormal involuntary movements appreciated  Gait and Station: ambulates with steady gait without assistance    PSYCHIATRIC   Arousal: alert  Sensorium/Orientation: person, place, situation, time/date  Behavior/Cooperation: normal, cooperative, eye contact normal   Psychomotor: unremarkable and within normal limits  Speech: normal tone, volume, prosody  Language: responds appropriately   Mood: "okay"   Affect: neutral  Thought Process: linear, logical  Thought Content:   Auditory hallucinations: NO     Visual hallucinations: NO  Paranoia: NO  Delusions:  NO  Suicidal ideation: YES: none currently     Homicidal ideation: YES: not willing to reveal name     Attention/Concentration:  intact  able to focus  Memory: Intact  Fund of Knowledge: Vocabulary appropriate    Insight: Decreased  Judgment:improving      Significant Labs:   Last 24 Hours:   Recent Lab Results     None          Significant Imaging: None  "

## 2017-04-16 NOTE — PLAN OF CARE
Problem: Patient Care Overview (Adult)  Goal: Plan of Care Review  Outcome: Ongoing (interventions implemented as appropriate)  Pt visible on the milieu, less agitated and irritable than yesterday. Pt showed no signs of aggression, remained calm and cooperative throughout shift. Pt denied Si/Hi/A/V hallucinations. Pt remained free from injury, NAD noted or reported.

## 2017-04-17 PROBLEM — F31.64 BIPOLAR DISORDER, CURR EPISODE MIXED, SEVERE, WITH PSYCHOTIC FEATURES: Status: ACTIVE | Noted: 2017-04-17

## 2017-04-17 LAB
ALBUMIN SERPL BCP-MCNC: 4 G/DL
ALP SERPL-CCNC: 64 U/L
ALT SERPL W/O P-5'-P-CCNC: 39 U/L
ANION GAP SERPL CALC-SCNC: 8 MMOL/L
AST SERPL-CCNC: 29 U/L
BASOPHILS # BLD AUTO: 0.02 K/UL
BASOPHILS NFR BLD: 0.3 %
BILIRUB SERPL-MCNC: 0.6 MG/DL
BUN SERPL-MCNC: 10 MG/DL
CALCIUM SERPL-MCNC: 9.8 MG/DL
CHLORIDE SERPL-SCNC: 103 MMOL/L
CO2 SERPL-SCNC: 27 MMOL/L
CREAT SERPL-MCNC: 0.7 MG/DL
DIFFERENTIAL METHOD: ABNORMAL
EOSINOPHIL # BLD AUTO: 0.1 K/UL
EOSINOPHIL NFR BLD: 2 %
ERYTHROCYTE [DISTWIDTH] IN BLOOD BY AUTOMATED COUNT: 13.2 %
EST. GFR  (AFRICAN AMERICAN): >60 ML/MIN/1.73 M^2
EST. GFR  (NON AFRICAN AMERICAN): >60 ML/MIN/1.73 M^2
FOLATE SERPL-MCNC: 13.8 NG/ML
GLUCOSE SERPL-MCNC: 69 MG/DL
HAV IGM SERPL QL IA: NEGATIVE
HBV CORE IGM SERPL QL IA: NEGATIVE
HBV SURFACE AG SERPL QL IA: NEGATIVE
HCT VFR BLD AUTO: 46.4 %
HCV AB SERPL QL IA: NEGATIVE
HGB BLD-MCNC: 15.6 G/DL
LYMPHOCYTES # BLD AUTO: 1.9 K/UL
LYMPHOCYTES NFR BLD: 30.7 %
MCH RBC QN AUTO: 32.9 PG
MCHC RBC AUTO-ENTMCNC: 33.6 %
MCV RBC AUTO: 98 FL
MONOCYTES # BLD AUTO: 0.4 K/UL
MONOCYTES NFR BLD: 6.4 %
NEUTROPHILS # BLD AUTO: 3.6 K/UL
NEUTROPHILS NFR BLD: 60.6 %
PLATELET # BLD AUTO: 153 K/UL
PMV BLD AUTO: 10.7 FL
POTASSIUM SERPL-SCNC: 4.1 MMOL/L
PROT SERPL-MCNC: 8.1 G/DL
RBC # BLD AUTO: 4.74 M/UL
SODIUM SERPL-SCNC: 138 MMOL/L
T3 SERPL-MCNC: 110 NG/DL
T4 FREE SERPL-MCNC: 0.89 NG/DL
TSH SERPL DL<=0.005 MIU/L-ACNC: 1.24 UIU/ML
VIT B12 SERPL-MCNC: 716 PG/ML
WBC # BLD AUTO: 6.06 K/UL

## 2017-04-17 PROCEDURE — 36415 COLL VENOUS BLD VENIPUNCTURE: CPT

## 2017-04-17 PROCEDURE — 25000003 PHARM REV CODE 250: Performed by: PSYCHIATRY & NEUROLOGY

## 2017-04-17 PROCEDURE — 84439 ASSAY OF FREE THYROXINE: CPT

## 2017-04-17 PROCEDURE — 80074 ACUTE HEPATITIS PANEL: CPT

## 2017-04-17 PROCEDURE — 82607 VITAMIN B-12: CPT

## 2017-04-17 PROCEDURE — 12400001 HC PSYCH SEMI-PRIVATE ROOM

## 2017-04-17 PROCEDURE — 85025 COMPLETE CBC W/AUTO DIFF WBC: CPT

## 2017-04-17 PROCEDURE — 82746 ASSAY OF FOLIC ACID SERUM: CPT

## 2017-04-17 PROCEDURE — 84443 ASSAY THYROID STIM HORMONE: CPT

## 2017-04-17 PROCEDURE — 90853 GROUP PSYCHOTHERAPY: CPT | Mod: S$PBB,,, | Performed by: PSYCHOLOGIST

## 2017-04-17 PROCEDURE — 80053 COMPREHEN METABOLIC PANEL: CPT

## 2017-04-17 PROCEDURE — 84480 ASSAY TRIIODOTHYRONINE (T3): CPT

## 2017-04-17 PROCEDURE — 99233 SBSQ HOSP IP/OBS HIGH 50: CPT | Mod: GC,,, | Performed by: PSYCHIATRY & NEUROLOGY

## 2017-04-17 RX ORDER — QUETIAPINE FUMARATE 200 MG/1
200 TABLET, FILM COATED ORAL 2 TIMES DAILY
Status: DISCONTINUED | OUTPATIENT
Start: 2017-04-17 | End: 2017-04-18

## 2017-04-17 RX ADMIN — FOLIC ACID 1 MG: 1 TABLET ORAL at 09:04

## 2017-04-17 RX ADMIN — RISPERIDONE 2 MG: 1 TABLET ORAL at 08:04

## 2017-04-17 RX ADMIN — NICOTINE 1 PATCH: 7 PATCH, EXTENDED RELEASE TRANSDERMAL at 09:04

## 2017-04-17 RX ADMIN — AMLODIPINE BESYLATE 5 MG: 2.5 TABLET ORAL at 09:04

## 2017-04-17 RX ADMIN — DIAZEPAM 5 MG: 5 TABLET ORAL at 09:04

## 2017-04-17 RX ADMIN — THERA TABS 1 TABLET: TAB at 09:04

## 2017-04-17 RX ADMIN — THIAMINE HCL TAB 100 MG 100 MG: 100 TAB at 09:04

## 2017-04-17 RX ADMIN — QUETIAPINE FUMARATE 200 MG: 200 TABLET, FILM COATED ORAL at 09:04

## 2017-04-17 RX ADMIN — QUETIAPINE FUMARATE 200 MG: 200 TABLET, FILM COATED ORAL at 08:04

## 2017-04-17 RX ADMIN — PANTOPRAZOLE SODIUM 40 MG: 40 TABLET, DELAYED RELEASE ORAL at 09:04

## 2017-04-17 RX ADMIN — HYDROXYZINE PAMOATE 50 MG: 50 CAPSULE ORAL at 08:04

## 2017-04-17 NOTE — PLAN OF CARE
Problem: Patient Care Overview (Adult)  Goal: Plan of Care Review  Outcome: Ongoing (interventions implemented as appropriate)  Patient is dressed in street clothes appearance is appropriate.  Irritable and agitated on the unit.  Patient endorses auditory hallucinations and persecutroy delusions.   Patient is focused on discharge.  Medication complaint.  Reviewed medcication regimen with patient.  He verbalized understanding.      Problem: Mood Impairment (Depressive Signs/Symptoms) (Adult)  Goal: Improved Mood Symptoms  Outcome: Ongoing (interventions implemented as appropriate)  Mood is labile  and irritable.  Affect is flat.  Endorses mood swings.     Problem: Impaired Control (Excessive Substance Use) (Adult)  Goal: Participates in Recovery Program  Outcome: Ongoing (interventions implemented as appropriate)  Minimally cooperative with plan of care.      Problem: Safety Awareness Impairment (Excessive Substance Use) (Adult)  Goal: Enhanced Safety Awareness  Outcome: Ongoing (interventions implemented as appropriate)  Poor insight into substance abuse issues.

## 2017-04-17 NOTE — PROGRESS NOTES
04/17/17 0900 04/17/17 1000 04/17/17 1100   [facility] Group Therapy   Group Name Community Reintegration Mental Awareness (guided imagery)   Specific Interventions Current Events (team building game) Relaxation Training   Participation Level Active;Attentive Active;Appropriate;Attentive Active;Appropriate;Attentive   Participation Quality Cooperative Cooperative Cooperative;Social   Insight/Motivation Good Good Good   Affect/Mood Display Irritable Irritable Irritable   Cognition Alert;Oriented Alert;Oriented Alert;Oriented       04/17/17 1400   [facility] Group Therapy   Group Name Therapeutic Recreation   Specific Interventions Crafts   Participation Level Active;Attentive;Sharing   Participation Quality Cooperative   Insight/Motivation Good   Affect/Mood Display Irritable   Cognition Alert;Oriented

## 2017-04-17 NOTE — PLAN OF CARE
Problem: Patient Care Overview (Adult)  Goal: Plan of Care Review  Pt awake and alert in his room. Denied SI/HI,A/V hallucinations. No agitation or aggressive behavior noted. Took scheduled medications without any problems. Encouraged out of room for group activities. Continue plan of care.

## 2017-04-17 NOTE — PROGRESS NOTES
Group Psychotherapy (PhD/LCSW)    Site: [facility]    Clinical status of patient: Inpatient    Date: 4/17/2017    Group Focus: Life Skills    Length of service: 61884 - 35-40 minutes    Number of patients in attendance: 7    Referred by: Acute Psychiatry Unit Treatment Team    Target symptoms: Alcohol Abuse, Substance Abuse and Psychosis    Patient's response to treatment: Active Listening Self-disclosure    Progress toward goals: Progressing slowly    Interval History:  Pt participated intermittently in a group discussion of the way small changes in one's behavior or attitudes can ripple out and sometimes have a big positive effect. Pt at times was attentive and at times appeared to retreat into his own thoughts.       Diagnosis: See Dr Pagan's notes on chart dated 4/17/17    Plan: Continue treatment on [facility]

## 2017-04-17 NOTE — NURSING
"Patient out of room cursing out staff for doing room checks. Patient stating he will "lay that m'kaylan out, open my door one more time". "don't you have a camera right there" Room checks explained to patient. Patient states "you a smart ass lil m'kaylan I'm going to find out ab this in the morning, open my door one more time and see". Will continue to monitor.   "
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
## 2017-04-17 NOTE — NURSING
Pt's asleep in bed after taking night time medications. Asleep from 0836-8207. Awake requesting water after 0200, back to sleep, restless the remainder of the morning. Pt. became agitated during room checks,and was  redirected back to his room. Will continue to monitor.

## 2017-04-17 NOTE — SUBJECTIVE & OBJECTIVE
"Interval History: Pt seen in treatment team this morning. He appears irritable and said he is not happy with his care in the hospital. He said "I am not on the medications I am supposed to be on" and then stated he should be on seroquel as that is the medication that has worked best for him in the past. Pt talked about his h/o psychiatric illness and said in the past he has had several episodes of mood lability and has gone 9 days without sleep in the past. He said his substance abuse history consists of cocaine and THC, but has most recently been alcohol (1 pint per day). Pt said he has never had a seizure from EtOH withdrawal. Pt endorses AH and paranoia. He denies SI/HI, plan or intent. He denies VH. He said he is angry about some of the other patients on the unit. He said he did not sleep well last night, but does endorse a good appetite.    PMHx  Past Medical History Reviewed    ROS  Respiratory ROS: no cough, shortness of breath, or wheezing  Cardiovascular ROS: no chest pain or dyspnea on exertion  Gastrointestinal ROS: no abdominal pain, change in bowel habits, or black or bloody stools      EXAMINATION    VITALS   Vitals:    04/16/17 1943   BP: (!) 147/97   Pulse: 103   Resp: 18   Temp: 98 °F (36.7 °C)       CONSTITUTIONAL  General Appearance: well-appearing    MUSCULOSKELETAL  Muscle Strength and Tone: no muscle weakness  Abnormal Involuntary Movements: no tremor noted  Gait and Station: ambulates without assistance    PSYCHIATRIC   Level of Consciousness: alert/awake  Orientation: oriented to person/place/date  Grooming: fair hygiene/grooming  Psychomotor Behavior: no PMR/PMA  Speech: accented, loud  Language: fluent English  Mood: "not good"  Affect: irritable  Thought Process: linear to questioning  Associations: no SHELLI  Thought Content: +AH; +paranoia; No HI/AVH  Memory: grossly intact  Attention: grossly intact  Fund of Knowledge: estimated to be below average  Insight: poor  Judgment: " poor      Psychotherapeutics     Start     Stop Route Frequency Ordered    04/11/17 0207  haloperidol tablet 5 mg  (Med - Acute  Behavioral Management)      -- Oral Every 6 hours PRN 04/11/17 0125    04/11/17 0208  lorazepam tablet 2 mg  (Med - Acute  Behavioral Management)      -- Oral Every 6 hours PRN 04/11/17 0125    04/11/17 0208  haloperidol lactate injection 5 mg  (Med - Acute  Behavioral Management)      -- IM Every 6 hours PRN 04/11/17 0125    04/11/17 0208  lorazepam injection 2 mg  (Med - Acute  Behavioral Management)      -- IM Every 6 hours PRN 04/11/17 0125    04/11/17 0209  lorazepam tablet 2 mg      -- Oral Every 4 hours PRN 04/11/17 0125    04/15/17 2100  risperidone tablet 2 mg      -- Oral Nightly 04/15/17 1120    04/15/17 1220  risperidone tablet 1 mg      -- Oral 2 times daily PRN 04/15/17 1120    04/16/17 0900  diazePAM tablet 5 mg      -- Oral Daily 04/15/17 1129           Review of Systems  Objective:     Vital Signs (Most Recent):  Temp: 98 °F (36.7 °C) (04/16/17 1943)  Pulse: 103 (04/16/17 1943)  Resp: 18 (04/16/17 1943)  BP: (!) 147/97 (04/16/17 1943) Vital Signs (24h Range):  Temp:  [98 °F (36.7 °C)-99 °F (37.2 °C)] 98 °F (36.7 °C)  Pulse:  [101-103] 103  Resp:  [18] 18  BP: (146-147)/() 147/97     Height: 6' (182.9 cm)  Weight: 77.3 kg (170 lb 6.7 oz)  Body mass index is 23.11 kg/(m^2).    No intake or output data in the 24 hours ending 04/17/17 0840    Physical Exam     Significant Labs: All pertinent labs within the past 24 hours have been reviewed.    Significant Imaging: I have reviewed all pertinent imaging results/findings within the past 24 hours.

## 2017-04-18 LAB
BILIRUB UR QL STRIP: NEGATIVE
CLARITY UR REFRACT.AUTO: CLEAR
COLOR UR AUTO: YELLOW
GLUCOSE UR QL STRIP: NEGATIVE
HGB UR QL STRIP: NEGATIVE
KETONES UR QL STRIP: NEGATIVE
LEUKOCYTE ESTERASE UR QL STRIP: NEGATIVE
NITRITE UR QL STRIP: NEGATIVE
PH UR STRIP: 5 [PH] (ref 5–8)
PROT UR QL STRIP: NEGATIVE
SP GR UR STRIP: 1.02 (ref 1–1.03)
URN SPEC COLLECT METH UR: NORMAL
UROBILINOGEN UR STRIP-ACNC: NEGATIVE EU/DL

## 2017-04-18 PROCEDURE — 25000003 PHARM REV CODE 250: Performed by: PSYCHIATRY & NEUROLOGY

## 2017-04-18 PROCEDURE — 12400001 HC PSYCH SEMI-PRIVATE ROOM

## 2017-04-18 PROCEDURE — 81003 URINALYSIS AUTO W/O SCOPE: CPT

## 2017-04-18 PROCEDURE — 99233 SBSQ HOSP IP/OBS HIGH 50: CPT | Mod: GC,,, | Performed by: PSYCHIATRY & NEUROLOGY

## 2017-04-18 RX ORDER — RISPERIDONE 1 MG/1
1 TABLET ORAL NIGHTLY
Status: DISCONTINUED | OUTPATIENT
Start: 2017-04-18 | End: 2017-04-19

## 2017-04-18 RX ORDER — QUETIAPINE FUMARATE 300 MG/1
300 TABLET, FILM COATED ORAL 2 TIMES DAILY
Status: DISCONTINUED | OUTPATIENT
Start: 2017-04-18 | End: 2017-04-19

## 2017-04-18 RX ADMIN — THIAMINE HCL TAB 100 MG 100 MG: 100 TAB at 10:04

## 2017-04-18 RX ADMIN — QUETIAPINE FUMARATE 300 MG: 300 TABLET, FILM COATED ORAL at 09:04

## 2017-04-18 RX ADMIN — QUETIAPINE FUMARATE 300 MG: 300 TABLET, FILM COATED ORAL at 10:04

## 2017-04-18 RX ADMIN — RISPERIDONE 1 MG: 1 TABLET ORAL at 09:04

## 2017-04-18 RX ADMIN — THERA TABS 1 TABLET: TAB at 10:04

## 2017-04-18 RX ADMIN — DIAZEPAM 5 MG: 5 TABLET ORAL at 10:04

## 2017-04-18 RX ADMIN — AMLODIPINE BESYLATE 5 MG: 2.5 TABLET ORAL at 10:04

## 2017-04-18 RX ADMIN — NICOTINE 1 PATCH: 7 PATCH, EXTENDED RELEASE TRANSDERMAL at 10:04

## 2017-04-18 RX ADMIN — HYDROXYZINE PAMOATE 50 MG: 50 CAPSULE ORAL at 09:04

## 2017-04-18 RX ADMIN — FOLIC ACID 1 MG: 1 TABLET ORAL at 10:04

## 2017-04-18 RX ADMIN — PANTOPRAZOLE SODIUM 40 MG: 40 TABLET, DELAYED RELEASE ORAL at 10:04

## 2017-04-18 NOTE — ASSESSMENT & PLAN NOTE
assisting  Looking into residential rehab   Will also call sister to investigate possible family assistance

## 2017-04-18 NOTE — PROGRESS NOTES
04/18/17 0900 04/18/17 1000 04/18/17 1100   [facility] Group Therapy   Group Name Community Reintegration Leisure Education --    Specific Interventions Current Events --  Relaxation Training   Participation Level --  --  --    Participation Quality Refused Refused Refused   Insight/Motivation --  --  --    Affect/Mood Display Agitated;Angry Agitated;Angry Agitated;Angry   Cognition --  --  --        04/18/17 1300   [facility]   Group Name Therapeutic Recreation   Specific Interventions (basketball toss)   Participation Level Active   Participation Quality Cooperative   Insight/Motivation Good   Affect/Mood Display Appropriate   Cognition Alert;Oriented

## 2017-04-18 NOTE — PROGRESS NOTES
SW has contacted several rehab centers in the area and pt's insurance is not accepted. Pt also provided numbers to several places of interest and they also do not accept Medicare.      Sw contacted [facility] in [place] and sw faxed referral over for possibilities.    Michelle contacted pt's sister who was available to pick pt up from [facility] in [place] as early as today.
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
## 2017-04-18 NOTE — ASSESSMENT & PLAN NOTE
He self reported heavy drinking prior to admit, but ETOH level non detectable in ER  He had some mild vital signs abnormalities on admit - short valium taper initiated  No s/sx complicated withdrawal at this time - cont to monitor  Discontinue valium taper
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
## 2017-04-18 NOTE — ASSESSMENT & PLAN NOTE
Could be contributing to reported psychotic symptoms  Pt now interested in rehab; SW can help facilitate this  Motivational interviewing and relapse prevention provided; will continue

## 2017-04-18 NOTE — SUBJECTIVE & OBJECTIVE
"Interval History: Pt presents for treatment team this morning. He said he feels good this morning and is feeling better with the seroquel. He denies any adverse effects of his medications. Pt said he is "ready to go" and said the treatment team should contact his SW, Chapis Rangel at [phone] who can help facilitate this. Pt said he would like to get into a rehab, because if he doesn't he knows he will go out and drink. Pt said he can go to "[facility]" rehab after discharge. Pt frequently states that he wants to leave throughout interview. He said he feels like the medications are helping, "but I feel like something is missing." He was unable to explain why he needs another medication and cannot say what medication he needs. He denies any SI/HI, plan or intent.     PMHx  Past Medical History Reviewed    ROS  Respiratory ROS: no cough, shortness of breath, or wheezing  Cardiovascular ROS: no chest pain or dyspnea on exertion  Gastrointestinal ROS: no abdominal pain, change in bowel habits, or black or bloody stools      EXAMINATION    VITALS   Vitals:    04/17/17 1930   BP: 115/78   Pulse: 103   Resp: 20   Temp: 98.4 °F (36.9 °C)       CONSTITUTIONAL  General Appearance: casually dressed    MUSCULOSKELETAL  Muscle Strength and Tone: no muscle weakness  Abnormal Involuntary Movements: no tremor noted  Gait and Station: ambulates without assistance    PSYCHIATRIC   Level of Consciousness: alert/awake  Orientation: oriented to person/place  Grooming: fair hygiene/grooming  Psychomotor Behavior: no PMR/PMA  Speech: accented, mumbled  Language: fluent English  Mood: "good"  Affect: irritable  Thought Process: linear to questioning  Associations: no SHELLI  Thought Content: no SI/HI/AVH  Memory: grossly intact  Attention: grossly intact  Fund of Knowledge: estimated to be below average  Insight: fair, improving  Judgment: fair, improving      Psychotherapeutics     Start     Stop Route Frequency Ordered    " 04/11/17 0207  haloperidol tablet 5 mg  (Med - Acute  Behavioral Management)      -- Oral Every 6 hours PRN 04/11/17 0125    04/11/17 0208  lorazepam tablet 2 mg  (Med - Acute  Behavioral Management)      -- Oral Every 6 hours PRN 04/11/17 0125    04/11/17 0208  haloperidol lactate injection 5 mg  (Med - Acute  Behavioral Management)      -- IM Every 6 hours PRN 04/11/17 0125    04/11/17 0208  lorazepam injection 2 mg  (Med - Acute  Behavioral Management)      -- IM Every 6 hours PRN 04/11/17 0125    04/11/17 0209  lorazepam tablet 2 mg      -- Oral Every 4 hours PRN 04/11/17 0125    04/15/17 2100  risperidone tablet 2 mg      -- Oral Nightly 04/15/17 1120    04/15/17 1220  risperidone tablet 1 mg      -- Oral 2 times daily PRN 04/15/17 1120    04/16/17 0900  diazePAM tablet 5 mg      -- Oral Daily 04/15/17 1129    04/17/17 1000  quetiapine tablet 200 mg      -- Oral 2 times daily 04/17/17 0844           Review of Systems  Objective:     Vital Signs (Most Recent):  Temp: 98.4 °F (36.9 °C) (04/17/17 1930)  Pulse: 103 (04/17/17 1930)  Resp: 20 (04/17/17 1930)  BP: 115/78 (04/17/17 1930) Vital Signs (24h Range):  Temp:  [98.4 °F (36.9 °C)] 98.4 °F (36.9 °C)  Pulse:  [103-119] 103  Resp:  [18-20] 20  BP: (115-136)/(78-96) 115/78     Height: 6' (182.9 cm)  Weight: 77.3 kg (170 lb 6.7 oz)  Body mass index is 23.11 kg/(m^2).    No intake or output data in the 24 hours ending 04/18/17 0951    Physical Exam     Significant Labs: All pertinent labs within the past 24 hours have been reviewed.    Significant Imaging: I have reviewed all pertinent imaging results/findings within the past 24 hours.

## 2017-04-18 NOTE — PROGRESS NOTES
Patient has been accepted into [facility] IN [place] for Thursday April 20th at 1p.m.    Patient must bring 30 day supply of medicine. DOCTORS; PLEASE CALL MEDS IN TO [facility] FOR . PT HAS MEDICARE.

## 2017-04-18 NOTE — ASSESSMENT & PLAN NOTE
Could be contributing to reported psychotic symptoms  He is now open to going to rehab; will speak with SW about helping facilitate this  Motivational interviewing and relapse prevention provided

## 2017-04-19 PROCEDURE — 97150 GROUP THERAPEUTIC PROCEDURES: CPT

## 2017-04-19 PROCEDURE — 99233 SBSQ HOSP IP/OBS HIGH 50: CPT | Mod: GC,,, | Performed by: PSYCHIATRY & NEUROLOGY

## 2017-04-19 PROCEDURE — 25000003 PHARM REV CODE 250: Performed by: PSYCHIATRY & NEUROLOGY

## 2017-04-19 PROCEDURE — 12400001 HC PSYCH SEMI-PRIVATE ROOM

## 2017-04-19 RX ORDER — RISPERIDONE 1 MG/1
2 TABLET ORAL NIGHTLY
Status: DISCONTINUED | OUTPATIENT
Start: 2017-04-19 | End: 2017-04-20 | Stop reason: HOSPADM

## 2017-04-19 RX ORDER — QUETIAPINE FUMARATE 300 MG/1
300 TABLET, FILM COATED ORAL NIGHTLY
Status: DISCONTINUED | OUTPATIENT
Start: 2017-04-19 | End: 2017-04-20 | Stop reason: HOSPADM

## 2017-04-19 RX ORDER — QUETIAPINE FUMARATE 100 MG/1
100 TABLET, FILM COATED ORAL DAILY
Status: DISCONTINUED | OUTPATIENT
Start: 2017-04-20 | End: 2017-04-20 | Stop reason: HOSPADM

## 2017-04-19 RX ADMIN — QUETIAPINE FUMARATE 300 MG: 300 TABLET, FILM COATED ORAL at 08:04

## 2017-04-19 RX ADMIN — HYDROXYZINE PAMOATE 50 MG: 50 CAPSULE ORAL at 10:04

## 2017-04-19 RX ADMIN — FOLIC ACID 1 MG: 1 TABLET ORAL at 08:04

## 2017-04-19 RX ADMIN — RISPERIDONE 1 MG: 1 TABLET ORAL at 10:04

## 2017-04-19 RX ADMIN — RISPERIDONE 1 MG: 1 TABLET ORAL at 12:04

## 2017-04-19 RX ADMIN — PANTOPRAZOLE SODIUM 40 MG: 40 TABLET, DELAYED RELEASE ORAL at 08:04

## 2017-04-19 RX ADMIN — NICOTINE 1 PATCH: 7 PATCH, EXTENDED RELEASE TRANSDERMAL at 08:04

## 2017-04-19 RX ADMIN — QUETIAPINE FUMARATE 300 MG: 300 TABLET, FILM COATED ORAL at 10:04

## 2017-04-19 RX ADMIN — THIAMINE HCL TAB 100 MG 100 MG: 100 TAB at 08:04

## 2017-04-19 RX ADMIN — THERA TABS 1 TABLET: TAB at 08:04

## 2017-04-19 RX ADMIN — AMLODIPINE BESYLATE 5 MG: 2.5 TABLET ORAL at 08:04
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
## 2017-04-19 NOTE — ASSESSMENT & PLAN NOTE
He self reported heavy drinking prior to admit, but ETOH level non detectable in ER  He had some mild vital signs abnormalities on admit - short valium taper initiated  No s/sx complicated withdrawal at this time - cont to monitor  Discontinued valium taper
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
## 2017-04-19 NOTE — PLAN OF CARE
Problem: Patient Care Overview (Adult)  Goal: Plan of Care Review  Outcome: Ongoing (interventions implemented as appropriate)  Patient is dressed in street clothes.  Cont to be focus on discharge.  Mood is less depressed but still irritable.  Verbalizing positives about pending discharge tomorrow.  Medication compliant.  Eating and sleeping well on the unit.     Problem: Mood Impairment (Depressive Signs/Symptoms) (Adult)  Goal: Improved Mood Symptoms  Outcome: Ongoing (interventions implemented as appropriate)  Mood is less depressed.  Patient is still irritable at times and easily agitated.     Problem: Safety Awareness Impairment (Excessive Substance Use) (Adult)  Goal: Enhanced Safety Awareness  Outcome: Outcome(s) achieved Date Met:  04/19/17  Will be discharge to rehab tomorrow.

## 2017-04-19 NOTE — PROGRESS NOTES
04/19/17 0900 04/19/17 1100 04/19/17 1400   [facility] Group Therapy   Group Name Community Reintegration --  Therapeutic Recreation   Specific Interventions Current Events Relaxation Training Crafts   Participation Level Active --  Active   Participation Quality Needs Redirection Refused;Disruptive Cooperative   Insight/Motivation Limited --  Limited   Affect/Mood Display Angry;Agitated;Irritable --  Irritable   Cognition Oriented;Alert --  Alert;Oriented
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
## 2017-04-20 VITALS
HEART RATE: 100 BPM | TEMPERATURE: 98 F | RESPIRATION RATE: 18 BRPM | SYSTOLIC BLOOD PRESSURE: 130 MMHG | HEIGHT: 72 IN | DIASTOLIC BLOOD PRESSURE: 94 MMHG | WEIGHT: 170.44 LBS | BODY MASS INDEX: 23.09 KG/M2

## 2017-04-20 PROBLEM — F31.64 BIPOLAR DISORDER, CURR EPISODE MIXED, SEVERE, WITH PSYCHOTIC FEATURES: Status: ACTIVE | Noted: 2017-04-20

## 2017-04-20 PROBLEM — F31.77 BIPOLAR I DISORDER, MOST RECENT EPISODE MIXED, IN PARTIAL REMISSION: Status: ACTIVE | Noted: 2017-04-17

## 2017-04-20 PROCEDURE — 25000003 PHARM REV CODE 250: Performed by: PSYCHIATRY & NEUROLOGY

## 2017-04-20 PROCEDURE — 99239 HOSP IP/OBS DSCHRG MGMT >30: CPT | Mod: GC,,, | Performed by: PSYCHIATRY & NEUROLOGY

## 2017-04-20 RX ORDER — HYDROXYZINE PAMOATE 50 MG/1
50 CAPSULE ORAL NIGHTLY
Qty: 30 CAPSULE | Refills: 0 | Status: SHIPPED | OUTPATIENT
Start: 2017-04-20

## 2017-04-20 RX ORDER — QUETIAPINE FUMARATE 100 MG/1
100 TABLET, FILM COATED ORAL DAILY
Qty: 30 TABLET | Refills: 0 | Status: SHIPPED | OUTPATIENT
Start: 2017-04-20 | End: 2018-04-20

## 2017-04-20 RX ORDER — QUETIAPINE FUMARATE 300 MG/1
300 TABLET, FILM COATED ORAL NIGHTLY
Qty: 30 TABLET | Refills: 0 | Status: SHIPPED | OUTPATIENT
Start: 2017-04-20 | End: 2018-04-20

## 2017-04-20 RX ORDER — AMLODIPINE BESYLATE 5 MG/1
5 TABLET ORAL DAILY
Qty: 30 TABLET | Refills: 0 | Status: SHIPPED | OUTPATIENT
Start: 2017-04-20 | End: 2018-04-20

## 2017-04-20 RX ORDER — RISPERIDONE 2 MG/1
2 TABLET ORAL NIGHTLY
Qty: 30 TABLET | Refills: 0 | Status: SHIPPED | OUTPATIENT
Start: 2017-04-20 | End: 2018-04-20

## 2017-04-20 RX ADMIN — QUETIAPINE FUMARATE 100 MG: 100 TABLET, FILM COATED ORAL at 09:04

## 2017-04-20 RX ADMIN — FOLIC ACID 1 MG: 1 TABLET ORAL at 09:04

## 2017-04-20 RX ADMIN — THERA TABS 1 TABLET: TAB at 09:04

## 2017-04-20 RX ADMIN — PANTOPRAZOLE SODIUM 40 MG: 40 TABLET, DELAYED RELEASE ORAL at 09:04

## 2017-04-20 RX ADMIN — THIAMINE HCL TAB 100 MG 100 MG: 100 TAB at 09:04

## 2017-04-20 RX ADMIN — AMLODIPINE BESYLATE 5 MG: 2.5 TABLET ORAL at 09:04

## 2017-04-20 NOTE — PLAN OF CARE
Pt discharged to [facility] via [facility] transport. Pt ambulatory with steady gait. Denies SI/HI/AVH. Denies depressed mood or thoughts of self harm. Pt provided with preprinted prescriptions for discharge medications and verbalized understanding of medication education. Pt belongings secured with [facility] . Pt provided with contact number to National Suicide prevention services and instructed to return to nearest emergency dept for re-occurring symptoms. Pt verbalizes and acknowledges understanding of all discharge instructions and follow up care.
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
## 2017-04-20 NOTE — PROGRESS NOTES
Assumed pt care pt up he requesting his HS medicine all given he returned to his room to get some sleep.

## 2017-04-20 NOTE — PROGRESS NOTES
Called [facility] pharmacy and they had not received fax with meds. Faxed scripts twice. Medicines not ready and they do not know at this time how much it's going to cost.
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
## 2017-04-21 NOTE — PT/OT/SLP DISCHARGE
Occupational Therapy Discharge Summary    Hung Cruz  MRN: [id number]   Bipolar I disorder, most recent episode mixed, in partial remission     Patient Discharged from acute Occupational Therapy on 4/20/2017.  Please refer to prior OT note dated on 4/19/2017 for functional status.     Assessment:   Patient was discharge unexpectedly.  Information required to complete and accurate discharge summary is unknown.  Refer to therapy initial evaluation and last progress note for initial and most recent functional status and goal achievement.  Recommendations made may be found in medical record.     GOALS:   1. Pt will remain in group 90% of session. MET 4/12; remain for consistency  2. Pt will be able to attend to task 100% with min verbal cues. Not met  3. Pt will be able to follow 2 step instructions with min verbal cues. Not met  4. Pt will be able to manage task with min level of frustration. Not met  5. Pt will be able to initiate participation in task with min verbal cues. Not met  6. Pt will participate in group with min encouragement. Not met  7. Pt will participate in group problem solving with min verbal cues. Not met  8. In order to address distracting thoughts/comments, Pt will be able to respond to min redirection within group discussion. Not met  9. Pt will interact with 2 group members in immediate environment during session. Not met  10. Pt will verbalize/demonstrate understanding of group purpose with min verbal cues at end of session. Not met  11. Pt will report and demo understanding of 1 positive self-affirmation to use to as coping skills. Not met  12. Pt will verbalize/demo understanding and identify use of 1-2 coping skills to use when upset. Not met      Patient Goals:  1. To stay away from the people in [facility]  2. To stay away from my family members  3. To move on with my life         Reasons for Discontinuation of Therapy Services  Transfer to alternate level of care.       Plan:  Patient Discharged to: Rehab ([facility])    LAKEISHA Pierre 4/21/2017

## 2018-10-19 NOTE — ASSESSMENT & PLAN NOTE
-Patient has been intrusive, perseverative tp, and is endorsed near constant AH; no current VH  -Re-start seroquel at 200 mg BID (this has been helpful for pt in the past)  -Continue Risperdal 2mg PO qhs and PRN Risperdal 1mg PO BID for breakthrough psychosis or agitation   -Continue 50mg Vistaril qHS to improve sleep   No

## 2021-09-17 NOTE — PLAN OF CARE
Chief complaint:   Chief Complaint   Patient presents with   • Office Visit   • Other     discuss Covid prevention, patient is refusing Covid vaccine at this time       Vitals:  Visit Vitals  /68 (BP Location: RUE - Right upper extremity, Patient Position: Sitting, Cuff Size: Regular)   Pulse 86   Temp 98.5 °F (36.9 °C) (Oral)   Ht 5' 4\" (1.626 m)   Wt 68.5 kg   SpO2 98%   BMI 25.92 kg/m²       HISTORY OF PRESENT ILLNESS     HPI  Patient presents for urgent visit.    Pt is unvaccinated for covid at this time.     She plans to travel to [place] tomorrow.     She comes today to ask about prevention options for covid, in particular asks about ivermectin.     Prevention recommendations from CDC were discussed in details.     https://www.cdc.gov/coronavirus/2019-ncov/prevent-getting-sick/prevention.html      Best method of prevention currently is vaccine, advised on risks and benefits, advised that even with Pfizer vaccine, it would take 2 weeks after second dose to get full protection.    Other methods are not FDA approved for prevention.   Monoclonal antibodies are not recommended for prevention for travel, but usually used for pt with infection or for high risk individuals who have already been exposed (hospical setting or living with infected individual).           Other significant problems:  Patient Active Problem List    Diagnosis Date Noted   • Atherosclerosis of native coronary artery of native heart without angina pectoris 06/18/2021     Priority: Low   • Hip fracture, right (CMS/[facility]) 01/07/2020     Priority: Low   • Status post right hip replacement 01/07/2020     Priority: Low   • Chest pain      Priority: Low   • Varicose veins of bilateral lower extremities with pain 07/18/2019     Priority: Low   • Primary osteoarthritis of right knee      Priority: Low   • Overactive bladder 08/07/2017     Priority: Low   • Constipation, slow transit 07/29/2015     Priority: Low     Abnormal Sitzmarks marker study,  Problem: Mood Impairment (Depressive Signs/Symptoms) (Adult)  Goal: Improved Mood Symptoms  Outcome: Ongoing (interventions implemented as appropriate)  Patient reports he is ready for discharge and want to go to rehab.  He is visible on the unit.  Does not appear to be responding to internal stimuli.  Medication compliant.  Sleeping and eating well.  Appearance is appropriate.  Attending groups and activities.      Problem: Impaired Control (Excessive Substance Use) (Adult)  Goal: Participates in Recovery Program  Outcome: Ongoing (interventions implemented as appropriate)  Patient participating in finding placement.      Problem: Safety Awareness Impairment (Excessive Substance Use) (Adult)  Goal: Enhanced Safety Awareness  Outcome: Ongoing (interventions implemented as appropriate)  Patient shows limited insight into safety awareness.         abnormal gastric emptying scan consistent with motility disorder.  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      • Insomnia 10/03/2012     Priority: Low   • Hypothyroidism 10/03/2012     Priority: Low   • Benign essential HTN 10/03/2012     Priority: Low   • Hyperlipemia, mixed 10/03/2012     Priority: Low   • Gastroesophageal reflux disease 10/03/2012     Priority: Low       PAST MEDICAL, FAMILY AND SOCIAL HISTORY     Medications:  Current Outpatient Medications   Medication   • triamcinolone (KENALOG) 0.5 % cream   • metoPROLOL succinate (Toprol XL) 25 MG 24 hr tablet   • cetirizine (ZyrTEC) 10 MG tablet   • acetaminophen (TYLENOL) 500 MG tablet   • albuterol 108 (90 Base) MCG/ACT inhaler   • Alum Hydroxide-Mag Trisilicate 80-14.2 MG Chew Tab   • diphenhydrAMINE (BENADRYL) 25 MG capsule   • ferrous sulfate 325 (65 FE) MG tablet   • docusate sodium-sennosides (SENOKOT S) 50-8.6 MG per tablet   • umeclidinium bromide (Incruse Ellipta) 62.5 MCG/INH inhaler   • hydroCORTisone (CORTIZONE) 2.5 % lotion   • bisacodyl (DULCOLAX) 10 MG suppository   • magnesium citrate 1.745 GM/30ML Solution   • Ibandronate Sodium (BONIVA PO)   • polyethylene glycol (MIRALAX) powder   • omeprazole (PRILOSEC) 40 MG capsule   • atorvastatin (LIPITOR) 10 MG tablet   • Alum Hydroxide-Mag Carbonate (GAVISCON PO)   • Cholecalciferol (VITAMIN D-3) 1000 units Cap   • traZODONE (DESYREL) 50 MG tablet   •  cyclobenzaprine (FLEXERIL) 10 MG tablet   • levothyroxine (SYNTHROID, LEVOTHROID) 75 MCG tablet   • Multiple Vitamin (MULTIVITAMIN) capsule   • clindamycin (CLEOCIN) 300 MG capsule     No current facility-administered medications for this visit.       Allergies:  ALLERGIES:   Allergen Reactions   • Adhesive   (Environmental) Other (See Comments)     \"Red, painful and sometimes the skin will break\"; per pt report  Clear tegaderm is OK  (no plastic or paper tape)   • Contrast Media HIVES and Nausea & Vomiting     Paralysis of lower limb   • Iodixanol HIVES   • Levaquin Other (See Comments)     Red from chest upward   • Propoxyphene PRURITUS   • Vancomycin Other (See Comments)     Itching to head, red face and chest after IV dose. Resolved quickly with IV Benedryl.   • Iodine Other (See Comments)   • Levothyroxine Sodium RASH   • Propoxyphene N-Apap HIVES   • Penicillins PRURITUS     Tolerated other B-lactam       Past Medical  History/Surgeries:  Past Medical History:   Diagnosis Date   • Abnormal glucose    • Alternating constipation and diarrhea    • Asthma     exercise induced, has resolved. Diagnosed around 2014.   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    • ESBL (extended spectrum beta-lactamase) producing bacteria infection    • Fecal incontinence    • Fitting and adjustment of pessary 6/12/2014    #3 Ring pessary placed removed 7/8/2014   • Gastroesophageal reflux disease    • Gastroparesis    • Hypertension    • Hypothyroidism    • Insomnia    • Mild intermittent asthma without complication    • Mitral valve prolapse    • Mixed  hyperlipidemia    • Multiple sclerosis (CMS/HCC)     6/6/2018: \"my doctor in [place] removed this diagnosis\"   • MILADYS (obstructive sleep apnea)     uses CPAP Nightly   • Osteopenia    • Osteoporosis    • Overactive bladder    • Pneumonia    • PONV (postoperative nausea and vomiting)    • Post-op pneumonia     following appendectomy   • PTSD (post-traumatic stress disorder)    • Pulmonary embolism (CMS/HCC) 7/19/2014   • Revised Uni-compartmental to Total Knee Arthroplasty - Right 5/21/2019   • SOB (shortness of breath)     after surgery   • TIA (transient ischemic attack) 08/2017    confirmed with CT of head @ [facility] in [place], [place]   • Urinary tract infection     recurrent; leading to sepsis   • Wears eyeglasses    • Wears reading eyeglasses        Past Surgical History:   Procedure Laterality Date   • Anes blepharoplasty upper eyelid Bilateral 2009   • Anterior vaginal repair  07/08/2014    Dr. Felder   • Appendectomy     • Arthroscopy knee medial or lat  2009   • Carpal tunnel release Right 2011   • D and c     • Dexa bone density axial skeleton  09/16/2010   • Gynecologic cryosurgery     • Hysterectomy  11/7/2006    Dr. Lama   • Implant procedure N/A 10/02/2017    interstim implant, for fecal incontinence   • Iridotomy / iridectomy  06/24/2019   • Joint replacement Right 2004    knee   • Joint replacement Left 2007    knee   • Joint replacement Right 05/21/2019    full knee   • Past surgical history  09/18/2017    Metonic rectal stimulator insertion; Stage 1 of 2   • Post colporrhaphy,rectum/vagina  11/7/2006    Dr. Felder: Vaginal suspension, posterior colporrhaphy, cystoscopy   • Removal of leg veins/ulcer Right 08/28/2019    Right EVLA & Phleb   • Total hip replacement  06/30/2021    RIGHT HIP REPLACEMENT   • Total knee replacement  05/21/2019    R knee   • Ureteral stent placement  2013    and removal   • [place] tooth extraction         Family History:  Family History   Problem Relation Age of Onset   •  Heart Mother         hx of silent MI   • Respiratory Mother         pulmonary emoblism   • Respiratory Father         pneumonia   • Other Father         facial injuries from professional boxing   • Cancer Paternal Uncle    • Stroke Maternal Grandmother    • Stroke Maternal Grandfather    • Patient is unaware of any medical problems Paternal Grandmother    • Patient is unaware of any medical problems Paternal Grandfather        Social History:  Social History     Tobacco Use   • Smoking status: Former Smoker     Packs/day: 1.00     Years: 3.00     Pack years: 3.00     Types: Cigarettes     Quit date: 1964     Years since quittin.2   • Smokeless tobacco: Never Used   Substance Use Topics   • Alcohol use: Yes     Alcohol/week: 0.0 standard drinks     Comment: rarely       REVIEW OF SYSTEMS     Review of Systems    PHYSICAL EXAM     Physical Exam  Constitutional:       General: She is not in acute distress.     Appearance: Normal appearance. She is not ill-appearing.   Pulmonary:      Effort: No respiratory distress.   Neurological:      Mental Status: She is alert.         ASSESSMENT/PLAN     Counseling for covid prevention as above, counseling on covid vaccines.   Recommendation provided to have covid vaccine, delay travel if it is possible for 2 weeks after vaccine is completed.
